# Patient Record
Sex: FEMALE | Race: WHITE | Employment: OTHER | URBAN - METROPOLITAN AREA
[De-identification: names, ages, dates, MRNs, and addresses within clinical notes are randomized per-mention and may not be internally consistent; named-entity substitution may affect disease eponyms.]

---

## 2019-03-05 ENCOUNTER — HOSPITAL ENCOUNTER (EMERGENCY)
Age: 74
Discharge: HOME | End: 2019-03-05
Attending: EMERGENCY MEDICINE | Admitting: EMERGENCY MEDICINE
Payer: MEDICARE

## 2019-03-05 ENCOUNTER — APPOINTMENT (OUTPATIENT)
Dept: GENERAL RADIOLOGY | Age: 74
End: 2019-03-05
Attending: EMERGENCY MEDICINE
Payer: MEDICARE

## 2019-03-05 VITALS
HEART RATE: 63 BPM | DIASTOLIC BLOOD PRESSURE: 79 MMHG | OXYGEN SATURATION: 96 % | BODY MASS INDEX: 27.15 KG/M2 | SYSTOLIC BLOOD PRESSURE: 149 MMHG | WEIGHT: 153.22 LBS | RESPIRATION RATE: 12 BRPM | HEIGHT: 63 IN | TEMPERATURE: 97.4 F

## 2019-03-05 DIAGNOSIS — J20.9 ACUTE BRONCHITIS, UNSPECIFIED ORGANISM: Primary | ICD-10-CM

## 2019-03-05 LAB
FLUAV AG NPH QL IA: NEGATIVE
FLUBV AG NOSE QL IA: NEGATIVE

## 2019-03-05 PROCEDURE — 77030029684 HC NEB SM VOL KT MONA -A

## 2019-03-05 PROCEDURE — 74011636637 HC RX REV CODE- 636/637: Performed by: EMERGENCY MEDICINE

## 2019-03-05 PROCEDURE — 99284 EMERGENCY DEPT VISIT MOD MDM: CPT

## 2019-03-05 PROCEDURE — 71046 X-RAY EXAM CHEST 2 VIEWS: CPT

## 2019-03-05 PROCEDURE — 87804 INFLUENZA ASSAY W/OPTIC: CPT

## 2019-03-05 PROCEDURE — 74011000250 HC RX REV CODE- 250: Performed by: EMERGENCY MEDICINE

## 2019-03-05 PROCEDURE — A9270 NON-COVERED ITEM OR SERVICE: HCPCS | Performed by: EMERGENCY MEDICINE

## 2019-03-05 PROCEDURE — 94640 AIRWAY INHALATION TREATMENT: CPT

## 2019-03-05 RX ORDER — PREDNISONE 20 MG/1
TABLET ORAL
Status: DISCONTINUED
Start: 2019-03-05 | End: 2019-03-05 | Stop reason: HOSPADM

## 2019-03-05 RX ORDER — AZITHROMYCIN 250 MG/1
TABLET, FILM COATED ORAL
Qty: 6 TAB | Refills: 0 | Status: SHIPPED | OUTPATIENT
Start: 2019-03-05 | End: 2020-02-11

## 2019-03-05 RX ORDER — IPRATROPIUM BROMIDE AND ALBUTEROL SULFATE 2.5; .5 MG/3ML; MG/3ML
3 SOLUTION RESPIRATORY (INHALATION)
Status: COMPLETED | OUTPATIENT
Start: 2019-03-05 | End: 2019-03-05

## 2019-03-05 RX ORDER — PREDNISONE 20 MG/1
60 TABLET ORAL
Status: COMPLETED | OUTPATIENT
Start: 2019-03-05 | End: 2019-03-05

## 2019-03-05 RX ORDER — IPRATROPIUM BROMIDE AND ALBUTEROL SULFATE 2.5; .5 MG/3ML; MG/3ML
SOLUTION RESPIRATORY (INHALATION)
Status: DISCONTINUED
Start: 2019-03-05 | End: 2019-03-05 | Stop reason: HOSPADM

## 2019-03-05 RX ORDER — PREDNISONE 20 MG/1
60 TABLET ORAL DAILY
Qty: 12 TAB | Refills: 0 | Status: SHIPPED | OUTPATIENT
Start: 2019-03-05 | End: 2019-03-09

## 2019-03-05 RX ORDER — ALBUTEROL SULFATE 90 UG/1
2 AEROSOL, METERED RESPIRATORY (INHALATION)
Qty: 1 INHALER | Refills: 0 | Status: SHIPPED | OUTPATIENT
Start: 2019-03-05 | End: 2020-02-11

## 2019-03-05 RX ADMIN — IPRATROPIUM BROMIDE AND ALBUTEROL SULFATE 3 ML: .5; 3 SOLUTION RESPIRATORY (INHALATION) at 01:19

## 2019-03-05 RX ADMIN — PREDNISONE 60 MG: 20 TABLET ORAL at 01:19

## 2019-03-05 NOTE — DISCHARGE INSTRUCTIONS
Patient Education        Bronchitis: Care Instructions  Your Care Instructions    Bronchitis is inflammation of the bronchial tubes, which carry air to the lungs. The tubes swell and produce mucus, or phlegm. The mucus and inflamed bronchial tubes make you cough. You may have trouble breathing. Most cases of bronchitis are caused by viruses like those that cause colds. Antibiotics usually do not help and they may be harmful. Bronchitis usually develops rapidly and lasts about 2 to 3 weeks in otherwise healthy people. Follow-up care is a key part of your treatment and safety. Be sure to make and go to all appointments, and call your doctor if you are having problems. It's also a good idea to know your test results and keep a list of the medicines you take. How can you care for yourself at home? · Take all medicines exactly as prescribed. Call your doctor if you think you are having a problem with your medicine. · Get some extra rest.  · Take an over-the-counter pain medicine, such as acetaminophen (Tylenol), ibuprofen (Advil, Motrin), or naproxen (Aleve) to reduce fever and relieve body aches. Read and follow all instructions on the label. · Do not take two or more pain medicines at the same time unless the doctor told you to. Many pain medicines have acetaminophen, which is Tylenol. Too much acetaminophen (Tylenol) can be harmful. · Take an over-the-counter cough medicine that contains dextromethorphan to help quiet a dry, hacking cough so that you can sleep. Avoid cough medicines that have more than one active ingredient. Read and follow all instructions on the label. · Breathe moist air from a humidifier, hot shower, or sink filled with hot water. The heat and moisture will thin mucus so you can cough it out. · Do not smoke. Smoking can make bronchitis worse. If you need help quitting, talk to your doctor about stop-smoking programs and medicines.  These can increase your chances of quitting for good.  When should you call for help? Call 911 anytime you think you may need emergency care. For example, call if:    · You have severe trouble breathing.    Call your doctor now or seek immediate medical care if:    · You have new or worse trouble breathing.     · You cough up dark brown or bloody mucus (sputum).     · You have a new or higher fever.     · You have a new rash.    Watch closely for changes in your health, and be sure to contact your doctor if:    · You cough more deeply or more often, especially if you notice more mucus or a change in the color of your mucus.     · You are not getting better as expected. Where can you learn more? Go to http://wai-fernando.info/. Enter H333 in the search box to learn more about \"Bronchitis: Care Instructions. \"  Current as of: September 5, 2018  Content Version: 11.9  © 8342-0952 [employer]. Care instructions adapted under license by [employer] (which disclaims liability or warranty for this information). If you have questions about a medical condition or this instruction, always ask your healthcare professional. [address] any warranty or liability for your use of this information.

## 2019-03-05 NOTE — ED NOTES
Discharge instructions reviewed with pt and copy given along with RX by Zoe Bennett. Pt educated on following up with PCP. Pt ambulatory from ED accompanied by son in no sign of distress or discomfort.

## 2019-03-05 NOTE — ED PROVIDER NOTES
Pt. Presents to the ER with a cough and SOB. Pt. Started to feel ill five days ago. Her SOB worsened this evening. She was coughing so much that she began to feel SOB. Her cough is non-productive. No CP. NO abd pain. Pt. Denies a history of lung disease. Pt. Has had multiple sick contacts with URI sx.  -fevers. This most recent episode of SOB started approximately 45min PTA. Past Medical History:   Diagnosis Date    Adverse effect of anesthesia     Slow to awake from Anesthesia    Difficult intubation 12/11/15    required glidescope and bougie    Hypertension     Ill-defined condition     Tinnitus    Ill-defined condition 2011    Fractured Left ankle       Past Surgical History:   Procedure Laterality Date    HX BREAST BIOPSY Left 2005? Benign    HX COLONOSCOPY      HX DILATION AND CURETTAGE      for uterine polyp    HX ORTHOPAEDIC Left 2011    ORIF Left ankle         History reviewed. No pertinent family history. Social History     Socioeconomic History    Marital status:      Spouse name: Not on file    Number of children: Not on file    Years of education: Not on file    Highest education level: Not on file   Social Needs    Financial resource strain: Not on file    Food insecurity - worry: Not on file    Food insecurity - inability: Not on file    Transportation needs - medical: Not on file   [facility] needs - non-medical: Not on file   Occupational History    Not on file   Tobacco Use    Smoking status: Never Smoker    Smokeless tobacco: Never Used   Substance and Sexual Activity    Alcohol use: No    Drug use: No    Sexual activity: Not on file   Other Topics Concern    Not on file   Social History Narrative    Not on file         ALLERGIES: Clindamycin and Statins-hmg-coa reductase inhibitors    Review of Systems   Constitutional: Negative for chills and fever. HENT: Negative for rhinorrhea and sore throat.     Respiratory: Positive for cough and shortness of breath. Cardiovascular: Negative for chest pain. Gastrointestinal: Negative for abdominal pain, diarrhea, nausea and vomiting. Genitourinary: Negative for dysuria and urgency. Musculoskeletal: Negative for arthralgias and back pain. Skin: Negative for rash. Neurological: Negative for dizziness, weakness and light-headedness. Vitals:    03/05/19 0000 03/05/19 0049   BP:  158/72   Pulse:  63   Resp:  12   Temp:  97.4 °F (36.3 °C)   SpO2:  97%   Weight: 69.5 kg (153 lb 3.5 oz)    Height: 5' 3\" (1.6 m)             Physical Exam     Vital signs reviewed. Nursing notes reviewed. Const:  No acute distress, well developed, well nourished  Head:  Atraumatic, normocephalic  Eyes:  PERRL, conjunctiva normal, no scleral icterus  Neck:  Supple, trachea midline  Cardiovascular:  RRR, no murmurs, no gallops, no rubs  Resp:  No resp distress, no increased work of breathing, no wheezes, no rhonchi, no rales, cough with deep inspiration  Abd:  Soft, non-tender, non-distended, no rebound, no guarding, no CVA tenderness  :  Deferred  MSK:  No pedal edema, normal ROM  Neuro:  Alert and oriented x3, no cranial nerve defect  Skin:  Warm, dry, intact  Psych: normal mood and affect, behavior is normal, judgement and thought content is normal          MDM  Number of Diagnoses or Management Options  Acute bronchitis, unspecified organism:      Amount and/or Complexity of Data Reviewed  Clinical lab tests: ordered and reviewed  Tests in the radiology section of CPT®: ordered and reviewed  Review and summarize past medical records: yes    Patient Progress  Patient progress: stable          Pt. Presents to the ER with complaints of cough and SOB. He is well appearing in the ER. NO pneumonia on xray. Negative flu. I will start her on a zpak, prednisone, and an albuterol inhaler. Pt. To f/u with her PCP or return to the ER with worsening sx.       Procedures

## 2019-03-05 NOTE — ED TRIAGE NOTES
Pt reports that tonight \"she started having a coughing fit and then started to have trouble breathing. \" Pt denies fevers or chest pain and all other complaints at this time.

## 2020-02-11 ENCOUNTER — APPOINTMENT (OUTPATIENT)
Dept: GENERAL RADIOLOGY | Age: 75
End: 2020-02-11
Attending: EMERGENCY MEDICINE
Payer: MEDICARE

## 2020-02-11 ENCOUNTER — HOSPITAL ENCOUNTER (EMERGENCY)
Age: 75
Discharge: HOME | End: 2020-02-11
Attending: EMERGENCY MEDICINE
Payer: MEDICARE

## 2020-02-11 VITALS
RESPIRATION RATE: 16 BRPM | OXYGEN SATURATION: 96 % | WEIGHT: 137.57 LBS | SYSTOLIC BLOOD PRESSURE: 154 MMHG | HEART RATE: 65 BPM | BODY MASS INDEX: 24.38 KG/M2 | DIASTOLIC BLOOD PRESSURE: 76 MMHG | HEIGHT: 63 IN | TEMPERATURE: 97.8 F

## 2020-02-11 DIAGNOSIS — R07.89 ATYPICAL CHEST PAIN: Primary | ICD-10-CM

## 2020-02-11 DIAGNOSIS — R07.9 CHEST PAIN, UNSPECIFIED TYPE: Primary | ICD-10-CM

## 2020-02-11 LAB
ALBUMIN SERPL-MCNC: 3.7 G/DL (ref 3.5–5)
ALBUMIN/GLOB SERPL: 1 {RATIO} (ref 1.1–2.2)
ALP SERPL-CCNC: 66 U/L (ref 45–117)
ALT SERPL-CCNC: 20 U/L (ref 12–78)
ANION GAP SERPL CALC-SCNC: 12 MMOL/L (ref 5–15)
AST SERPL-CCNC: 15 U/L (ref 15–37)
ATRIAL RATE: 57 BPM
ATRIAL RATE: 61 BPM
BASOPHILS # BLD: 0 K/UL (ref 0–0.1)
BASOPHILS NFR BLD: 0 % (ref 0–1)
BILIRUB SERPL-MCNC: 0.6 MG/DL (ref 0.2–1)
BNP SERPL-MCNC: 141 PG/ML (ref 0–125)
BUN SERPL-MCNC: 24 MG/DL (ref 6–20)
BUN/CREAT SERPL: 29 (ref 12–20)
CALCIUM SERPL-MCNC: 8.9 MG/DL (ref 8.5–10.1)
CALCULATED P AXIS, ECG09: 19 DEGREES
CALCULATED P AXIS, ECG09: 20 DEGREES
CALCULATED R AXIS, ECG10: -17 DEGREES
CALCULATED R AXIS, ECG10: -8 DEGREES
CALCULATED T AXIS, ECG11: 24 DEGREES
CALCULATED T AXIS, ECG11: 4 DEGREES
CHLORIDE SERPL-SCNC: 100 MMOL/L (ref 97–108)
CO2 SERPL-SCNC: 27 MMOL/L (ref 21–32)
COMMENT, HOLDF: NORMAL
CREAT SERPL-MCNC: 0.82 MG/DL (ref 0.55–1.02)
DIAGNOSIS, 93000: NORMAL
DIAGNOSIS, 93000: NORMAL
DIFFERENTIAL METHOD BLD: NORMAL
EOSINOPHIL # BLD: 0 K/UL (ref 0–0.4)
EOSINOPHIL NFR BLD: 0 % (ref 0–7)
ERYTHROCYTE [DISTWIDTH] IN BLOOD BY AUTOMATED COUNT: 12.5 % (ref 11.5–14.5)
GLOBULIN SER CALC-MCNC: 3.7 G/DL (ref 2–4)
GLUCOSE SERPL-MCNC: 95 MG/DL (ref 65–100)
HCT VFR BLD AUTO: 42.4 % (ref 35–47)
HGB BLD-MCNC: 13.9 G/DL (ref 11.5–16)
IMM GRANULOCYTES # BLD AUTO: 0 K/UL (ref 0–0.04)
IMM GRANULOCYTES NFR BLD AUTO: 0 % (ref 0–0.5)
LYMPHOCYTES # BLD: 1.2 K/UL (ref 0.8–3.5)
LYMPHOCYTES NFR BLD: 18 % (ref 12–49)
MCH RBC QN AUTO: 30 PG (ref 26–34)
MCHC RBC AUTO-ENTMCNC: 32.8 G/DL (ref 30–36.5)
MCV RBC AUTO: 91.4 FL (ref 80–99)
MONOCYTES # BLD: 0.5 K/UL (ref 0–1)
MONOCYTES NFR BLD: 7 % (ref 5–13)
NEUTS SEG # BLD: 5 K/UL (ref 1.8–8)
NEUTS SEG NFR BLD: 75 % (ref 32–75)
NRBC # BLD: 0 K/UL (ref 0–0.01)
NRBC BLD-RTO: 0 PER 100 WBC
P-R INTERVAL, ECG05: 154 MS
P-R INTERVAL, ECG05: 158 MS
PLATELET # BLD AUTO: 223 K/UL (ref 150–400)
PMV BLD AUTO: 10.9 FL (ref 8.9–12.9)
POTASSIUM SERPL-SCNC: 3.6 MMOL/L (ref 3.5–5.1)
PROT SERPL-MCNC: 7.4 G/DL (ref 6.4–8.2)
Q-T INTERVAL, ECG07: 434 MS
Q-T INTERVAL, ECG07: 436 MS
QRS DURATION, ECG06: 90 MS
QRS DURATION, ECG06: 94 MS
QTC CALCULATION (BEZET), ECG08: 424 MS
QTC CALCULATION (BEZET), ECG08: 436 MS
RBC # BLD AUTO: 4.64 M/UL (ref 3.8–5.2)
SAMPLES BEING HELD,HOLD: NORMAL
SODIUM SERPL-SCNC: 139 MMOL/L (ref 136–145)
TROPONIN I SERPL-MCNC: <0.05 NG/ML
TROPONIN I SERPL-MCNC: <0.05 NG/ML
VENTRICULAR RATE, ECG03: 57 BPM
VENTRICULAR RATE, ECG03: 61 BPM
WBC # BLD AUTO: 6.7 K/UL (ref 3.6–11)

## 2020-02-11 PROCEDURE — 80053 COMPREHEN METABOLIC PANEL: CPT

## 2020-02-11 PROCEDURE — 93005 ELECTROCARDIOGRAM TRACING: CPT

## 2020-02-11 PROCEDURE — 84484 ASSAY OF TROPONIN QUANT: CPT

## 2020-02-11 PROCEDURE — 99285 EMERGENCY DEPT VISIT HI MDM: CPT

## 2020-02-11 PROCEDURE — 85025 COMPLETE CBC W/AUTO DIFF WBC: CPT

## 2020-02-11 PROCEDURE — 36415 COLL VENOUS BLD VENIPUNCTURE: CPT

## 2020-02-11 PROCEDURE — 83880 ASSAY OF NATRIURETIC PEPTIDE: CPT

## 2020-02-11 RX ORDER — PANTOPRAZOLE SODIUM 40 MG/1
40 TABLET, DELAYED RELEASE ORAL DAILY
COMMUNITY
End: 2021-10-06 | Stop reason: SDUPTHER

## 2020-02-11 RX ORDER — LOVASTATIN 20 MG/1
20 TABLET ORAL EVERY OTHER DAY
COMMUNITY
End: 2020-06-11 | Stop reason: SDUPTHER

## 2020-02-11 NOTE — ED NOTES
Pt ambulatory out of ED with discharge instructions in hand given by Dr. Josselyn Schneider; pt verbalized understanding of discharge paperwork and time allotted for questions. VSS. Pt alert and oriented.

## 2020-02-11 NOTE — DISCHARGE INSTRUCTIONS

## 2020-02-11 NOTE — ED PROVIDER NOTES
HPI   The patient is a 23-year-old white female with a history of anxiety since her   about 6 years prior to admission presents today [facility]. She told him that she had some substernal chest pain that was nonexertional that lasted for about 30 minutes earlier today. It went away after she drank some water. She had no diaphoresis or nausea. She has had similar spells for the past 2 years due to acid reflux and she had a cardiac evaluation about a year and a half ago for the chest pain. This did not include any stress test or cardiac cath. She has a history of high cholesterol. She has no chest pain at this time an EKG shows normal sinus rhythm with no ST changes. [facility] to check cardiac enzymes and for further evaluation. Past Medical History:   Diagnosis Date    Adverse effect of anesthesia     Slow to awake from Anesthesia    Difficult intubation 12/11/15    required glidescope and bougie    Hypertension     Ill-defined condition     Tinnitus    Ill-defined condition     Fractured Left ankle       Past Surgical History:   Procedure Laterality Date    HX BREAST BIOPSY Left ? Benign    HX COLONOSCOPY      HX DILATION AND CURETTAGE      for uterine polyp    HX ORTHOPAEDIC Left 2011    ORIF Left ankle         History reviewed. No pertinent family history.     Social History     Socioeconomic History    Marital status:      Spouse name: Not on file    Number of children: Not on file    Years of education: Not on file    Highest education level: Not on file   Occupational History    Not on file   Social Needs    Financial resource strain: Not on file    Food insecurity:     Worry: Not on file     Inability: Not on file    Transportation needs:     Medical: Not on file     Non-medical: Not on file   Tobacco Use    Smoking status: Never Smoker    Smokeless tobacco: Never Used Substance and Sexual Activity    Alcohol use: No    Drug use: No    Sexual activity: Not on file   Lifestyle    Physical activity:     Days per week: Not on file     Minutes per session: Not on file    Stress: Not on file   Relationships    Social connections:     Talks on phone: Not on file     Gets together: Not on file     Attends [employer] service: Not on file     Active member of club or organization: Not on file     Attends meetings of clubs or organizations: Not on file     Relationship status: Not on file    Intimate partner violence:     Fear of current or ex partner: Not on file     Emotionally abused: Not on file     Physically abused: Not on file     Forced sexual activity: Not on file   Other Topics Concern    Not on file   Social History Narrative    Not on file         ALLERGIES: Clindamycin and Statins-hmg-coa reductase inhibitors    Review of Systems   All other systems reviewed and are negative. Vitals:    02/11/20 1345   BP: 183/73   Pulse: 87   Resp: 18   Temp: 97.7 °F (36.5 °C)   SpO2: 100%   Weight: 62.4 kg (137 lb 9.1 oz)   Height: 5' 3\" (1.6 m)            Physical Exam  Vitals signs and nursing note reviewed. Constitutional:       Appearance: She is well-developed. HENT:      Head: Normocephalic and atraumatic. Mouth/Throat:      Pharynx: No oropharyngeal exudate. Eyes:      General: No scleral icterus. Conjunctiva/sclera: Conjunctivae normal.   Neck:      Musculoskeletal: Neck supple. Thyroid: No thyromegaly. Cardiovascular:      Rate and Rhythm: Normal rate and regular rhythm. Heart sounds: Normal heart sounds. No murmur. No friction rub. No gallop. Pulmonary:      Effort: Pulmonary effort is normal. No respiratory distress. Breath sounds: Normal breath sounds. No stridor. No wheezing or rales. Abdominal:      General: Bowel sounds are normal.      Palpations: Abdomen is soft. Tenderness: There is no abdominal tenderness.  There is no guarding or rebound. Musculoskeletal: Normal range of motion. Lymphadenopathy:      Cervical: No cervical adenopathy. Skin:     General: Skin is warm and dry. Neurological:      Mental Status: She is alert and oriented to person, place, and time.           MDM       Procedures

## 2020-02-11 NOTE — ED TRIAGE NOTES
Triage: pt c/o mid chest pain radiated into right and left side of chest starting when woke this AM. Pt was seen at [facility] and referred here for cardiac work up. Denies SOB, N/V/D, fever, dizziness.

## 2020-02-12 ENCOUNTER — TELEPHONE (OUTPATIENT)
Dept: CARDIOLOGY CLINIC | Age: 75
End: 2020-02-12

## 2020-02-12 NOTE — TELEPHONE ENCOUNTER
Patient is calling back to schedule a stress echo and new patient appointment w/ Dr. Tita Rosas for this week. She was advised to get have this test done before she leaves for [place]. Please advise.     Phone #: [phone]  Thanks

## 2020-02-12 NOTE — TELEPHONE ENCOUNTER
Verified patient with two types of identifiers. Let patient know that unfortunately we are booked out to the end of February for PAUL in the office. Scheduled patient for tomorrow at [facility] at 1030. Went over instructions for the test and her new patient appointment with Dr. Sivakumar Candelario on Friday at 80. Patient verbalized understanding and will call with any other questions.

## 2020-02-13 ENCOUNTER — HOSPITAL ENCOUNTER (OUTPATIENT)
Dept: NON INVASIVE DIAGNOSTICS | Age: 75
Discharge: HOME | End: 2020-02-13
Attending: SPECIALIST
Payer: MEDICARE

## 2020-02-13 VITALS
BODY MASS INDEX: 24.27 KG/M2 | WEIGHT: 137 LBS | DIASTOLIC BLOOD PRESSURE: 80 MMHG | HEIGHT: 63 IN | SYSTOLIC BLOOD PRESSURE: 147 MMHG

## 2020-02-13 PROCEDURE — 93351 STRESS TTE COMPLETE: CPT

## 2020-02-14 ENCOUNTER — OFFICE VISIT (OUTPATIENT)
Dept: CARDIOLOGY CLINIC | Age: 75
End: 2020-02-14

## 2020-02-14 VITALS
SYSTOLIC BLOOD PRESSURE: 126 MMHG | BODY MASS INDEX: 26.93 KG/M2 | HEART RATE: 69 BPM | DIASTOLIC BLOOD PRESSURE: 78 MMHG | RESPIRATION RATE: 17 BRPM | HEIGHT: 63 IN | WEIGHT: 152 LBS | OXYGEN SATURATION: 99 %

## 2020-02-14 DIAGNOSIS — I10 ESSENTIAL HYPERTENSION: Primary | ICD-10-CM

## 2020-02-14 DIAGNOSIS — R07.89 OTHER CHEST PAIN: ICD-10-CM

## 2020-02-14 DIAGNOSIS — K21.00 GASTROESOPHAGEAL REFLUX DISEASE WITH ESOPHAGITIS: ICD-10-CM

## 2020-02-14 DIAGNOSIS — E78.49 OTHER HYPERLIPIDEMIA: ICD-10-CM

## 2020-02-14 LAB
STRESS ANGINA INDEX: 0
STRESS BASELINE DIAS BP: 80 MMHG
STRESS BASELINE HR: 58 BPM
STRESS BASELINE SYS BP: 143 MMHG
STRESS ESTIMATED WORKLOAD: 9.5 METS
STRESS EXERCISE DUR MIN: NORMAL
STRESS PEAK DIAS BP: 74 MMHG
STRESS PEAK SYS BP: 148 MMHG
STRESS PERCENT HR ACHIEVED: 91 %
STRESS POST PEAK HR: 133 BPM
STRESS RATE PRESSURE PRODUCT: NORMAL BPM*MMHG
STRESS SR DUKE TREADMILL SCORE: 0
STRESS ST DEPRESSION: 0 MM
STRESS ST ELEVATION: 0 MM
STRESS TARGET HR: 146 BPM

## 2020-02-14 NOTE — PROGRESS NOTES
HISTORY OF PRESENT ILLNESS  Elli Box is a 77 y.o. female. Patient with a history of hypertension, hyperlipidemia who was [facility] in February 2020 for chest discomfort. Work-up at that point was negative for myocardial ischemia. She was referred here for further evaluation. 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 02/13/20   ECHO STRESS 02/14/2020 2/14/2020    Narrative · Normal stress echocardiogram. Low risk study. Signed by: Nancy Pereira MD       Past Medical History:   Diagnosis Date    Adverse effect of anesthesia     Slow to awake from Anesthesia    Difficult intubation 12/11/15    required glidescope and bougie    Hypertension     Ill-defined condition     Tinnitus    Ill-defined condition 2011    Fractured Left ankle     Past Surgical History:   Procedure Laterality Date    HX BREAST BIOPSY Left 2005? Benign    HX COLONOSCOPY      HX DILATION AND CURETTAGE      for uterine polyp    HX ORTHOPAEDIC Left 2011    ORIF Left ankle     Social History     Tobacco Use    Smoking status: Never Smoker    Smokeless tobacco: Never Used   Substance Use Topics    Alcohol use: No    Drug use: No     History reviewed. No pertinent family history. HPI  The patient tells me that chest pain that she has had the days she went to the emergency room this was very similar to her gastroesophageal reflux disease chest pain. She has had no recurrence thus far. No shortness of breath no palpitations. Review of Systems   Respiratory: Negative. Cardiovascular: Negative. Physical Exam  Physical Exam   Blood pressure 126/78, pulse 69, resp. rate 17, height 5' 3\" (1.6 m), weight 152 lb (68.9 kg), SpO2 99 %. Constitutional: She is oriented to person, place, and time. She appears well-developed and well-nourished. No distress. HENT: Head: Normocephalic. Eyes: No scleral icterus. Neck: Normal range of motion. Neck supple.  No JVD present. No tracheal deviation present. Cardiovascular: Normal rate, regular rhythm, normal heart sounds and intact distal pulses. Exam reveals no gallop and no friction rub. No murmur heard. Pulmonary/Chest: Effort normal and breath sounds normal. No stridor. No respiratory distress, wheezes or  rales. Abdominal: She exhibits no distension. Musculoskeletal: She exhibits no edema. Neurological: She is alert and oriented to person, place, and time. Coordination normal.   Skin: Skin is warm. No rash noted. Not diaphoretic. No erythema. Psychiatric:  Normal mood and affect. Behavior is normal.     ASSESSMENT and PLAN  1. Chest pain: Chest pain had typical and atypical features. She does have some risk factors for coronary artery disease. Her electrocardiogram done in the hospital revealed normal sinus rhythm without acute ST-T wave abnormalities. She has undergone stress echocardiogram as above. This failed to reveal any significant ischemia and revealed a normal ejection fraction. She has had no recurrent chest discomfort at this time. I discussed with her the potential for false positive and false negative stress test and she will let me know if she has any recurring chest discomfort. In the meanwhile though I also encouraged her to follow-up with her primary care physician and consider GI evaluation. 2.  Hypertension: This seems to be very well controlled today. 3.  Hyperlipidemia: Closely followed by her primary care physician. We have discussed possible follow-up she wants to follow-up on a as needed basis at this point which I feel is very reasonable.

## 2020-02-21 ENCOUNTER — OFFICE VISIT (OUTPATIENT)
Dept: PRIMARY CARE CLINIC | Age: 75
End: 2020-02-21

## 2020-02-21 VITALS
HEIGHT: 63 IN | DIASTOLIC BLOOD PRESSURE: 79 MMHG | SYSTOLIC BLOOD PRESSURE: 131 MMHG | BODY MASS INDEX: 26.97 KG/M2 | RESPIRATION RATE: 16 BRPM | HEART RATE: 78 BPM | WEIGHT: 152.2 LBS | TEMPERATURE: 97.6 F | OXYGEN SATURATION: 97 %

## 2020-02-21 DIAGNOSIS — I10 ESSENTIAL HYPERTENSION: ICD-10-CM

## 2020-02-21 DIAGNOSIS — R06.2 EXPIRATORY WHEEZING: ICD-10-CM

## 2020-02-21 DIAGNOSIS — E78.2 MIXED HYPERLIPIDEMIA: ICD-10-CM

## 2020-02-21 DIAGNOSIS — J20.9 BRONCHITIS, ACUTE, WITH BRONCHOSPASM: Primary | ICD-10-CM

## 2020-02-21 DIAGNOSIS — K22.719 BARRETT'S ESOPHAGUS WITH DYSPLASIA: ICD-10-CM

## 2020-02-21 DIAGNOSIS — M25.552 LEFT HIP PAIN: ICD-10-CM

## 2020-02-21 PROBLEM — R07.89 OTHER CHEST PAIN: Status: RESOLVED | Noted: 2020-02-14 | Resolved: 2020-02-21

## 2020-02-21 RX ORDER — AZITHROMYCIN 250 MG/1
TABLET, FILM COATED ORAL
Qty: 6 TAB | Refills: 0 | Status: SHIPPED | OUTPATIENT
Start: 2020-02-21 | End: 2020-02-26

## 2020-02-21 RX ORDER — ALBUTEROL SULFATE 0.83 MG/ML
2.5 SOLUTION RESPIRATORY (INHALATION) ONCE
Qty: 1 EACH | Refills: 0
Start: 2020-02-21 | End: 2020-02-21

## 2020-02-21 RX ORDER — BENZONATATE 200 MG/1
200 CAPSULE ORAL
Qty: 21 CAP | Refills: 0 | Status: SHIPPED | OUTPATIENT
Start: 2020-02-21 | End: 2020-02-28

## 2020-02-21 RX ORDER — METHYLPREDNISOLONE 4 MG/1
TABLET ORAL
Qty: 1 DOSE PACK | Refills: 0 | Status: SHIPPED | OUTPATIENT
Start: 2020-02-21 | End: 2020-05-27 | Stop reason: ALTCHOICE

## 2020-02-21 RX ORDER — ALBUTEROL SULFATE 90 UG/1
AEROSOL, METERED RESPIRATORY (INHALATION)
Status: ON HOLD | COMMUNITY
End: 2022-03-24

## 2020-02-21 NOTE — PROGRESS NOTES
Written by Alie Napier, as dictated by Dr. Chanel Murillo MD.    Rishi Vaca is a 77 y.o. female. HPI  The patient comes in today to establish care. She moved from [place] 6 years ago and was previously going to [facility], but has had several problems coming up. She needs a new PCP. She repors F hx of heart disease (mother). She c/o cold symptoms x 2 days. She endorses congestion, wheezing, and cough. She notes she was previously at [facility], and thinks she may have picked up something while there. She has a h/o bronchitis in the past. She has not taken any medication yet. She was given  a nebulizing treatment in office today, which she reports helped. She went to the ED on 03/05/2020 for chest pain. She was then seen by Dr. 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 She is currently taking Protonix 40 mg. She reports F hx of esophageal cancer (grandfather) and colon cancer (father). She does have back pain with L-sided sciatica, for which she has been taking Motrin, but it has caused GERD. She has been doing exercises given to her previously by PT, which has helped out. She has a h/o high cholesterol, but notes that she has reactions to statin. She is currently taking Lovastatin 20 mg every other day. She notes that she has had h/o C. Diff from Clindamycin in the past, and is worried about taking antibiotics. She has a h/o hypertension and takes Lisinopril-HCTZ 20-12.5 mg. BP in office looks good today. Patient Active Problem List   Diagnosis Code    Essential hypertension I10    Other hyperlipidemia E78.49        Current Outpatient Medications on File Prior to Visit   Medication Sig Dispense Refill    albuterol (PROAIR HFA) 90 mcg/actuation inhaler Take  by inhalation.  lovastatin (MEVACOR) 20 mg tablet Take 20 mg by mouth every other day.       pantoprazole (PROTONIX) 40 mg tablet Take 40 mg by mouth daily.  lisinopril-hydrochlorothiazide (PRINZIDE, ZESTORETIC) 20-12.5 mg per tablet Take 1 Tab by mouth Daily (before breakfast).  co-enzyme Q-10 (CO Q-10) 100 mg capsule Take 200 mg by mouth daily (after dinner).  OTHER Take 2 Caps by mouth daily (after dinner). Juice Plus-Orchard Blend- Blended Fruit and Vegetable Juice Powders-takes 2 caps daily  Juice Plus Garden Blend-Blended fruit and vegetable juice powders-takes 2 caps daily       No current facility-administered medications on file prior to visit. Allergies   Allergen Reactions    Clindamycin Diarrhea     \"It gave me C-Dif. \"    Statins-Hmg-Coa Reductase Inhibitors Other (comments)     \"It makes all of my joints ache. \"       Past Medical History:   Diagnosis Date    Adverse effect of anesthesia     Slow to awake from Anesthesia    Difficult intubation 12/11/15    required glidescope and bougie    Hypertension     Ill-defined condition     Tinnitus    Ill-defined condition 2011    Fractured Left ankle       Past Surgical History:   Procedure Laterality Date    HX BREAST BIOPSY Left 2005?     Benign    HX COLONOSCOPY      HX DILATION AND CURETTAGE      for uterine polyp    HX ORTHOPAEDIC Left 2011    ORIF Left ankle       Family History   Problem Relation Age of Onset    Stroke Mother     Colon Cancer Father        Social History     Socioeconomic History    Marital status:      Spouse name: Not on file    Number of children: Not on file    Years of education: Not on file    Highest education level: Not on file   Occupational History    Not on file   Social Needs    Financial resource strain: Not on file    Food insecurity:     Worry: Not on file     Inability: Not on file    Transportation needs:     Medical: Not on file     Non-medical: Not on file   Tobacco Use    Smoking status: Never Smoker    Smokeless tobacco: Never Used   Substance and Sexual Activity    Alcohol use: No    Drug use: No    Sexual activity: Not on file   Lifestyle    Physical activity:     Days per week: Not on file     Minutes per session: Not on file    Stress: Not on file   Relationships    Social connections:     Talks on phone: Not on file     Gets together: Not on file     Attends Mu-ism service: Not on file     Active member of club or organization: Not on file     Attends meetings of clubs or organizations: Not on file     Relationship status: Not on file    Intimate partner violence:     Fear of current or ex partner: Not on file     Emotionally abused: Not on file     Physically abused: Not on file     Forced sexual activity: Not on file   Other Topics Concern    Not on file   Social History Narrative    Not on file       Hospital Outpatient Visit on 02/13/2020   Component Date Value Ref Range Status    Target HR 02/13/2020 146  bpm Final    Exercise duration time 02/13/2020 00:07:39   Final    Stress Base Systolic BP 01/29/5988 654  mmHg Final    Stress Base Diastolic BP 02/95/1534 74  mmHg Final    Post peak HR 02/13/2020 133  BPM Final    Baseline HR 02/13/2020 58  BPM Final    Estimated workload 02/13/2020 9.5  METS Final    Baseline BP 02/13/2020 143  mmHg Final    Percent HR 02/13/2020 91  % Final    Angina Index 02/13/2020 0   Final    Stress Base Diastolic BP 66/85/4078 80  mmHg Final    Stress Rate Pressure Product 02/13/2020 19,684  BPM*mmHg Final    ST Elevation (mm) 02/13/2020 0  mm Final    ST Depression (mm) 02/13/2020 0  mm Final    STRESS SR BULLARD TREADMILL SCORE 02/13/2020 0   Final   Admission on 02/11/2020, Discharged on 02/11/2020   Component Date Value Ref Range Status    Ventricular Rate 02/11/2020 61  BPM Final    Atrial Rate 02/11/2020 61  BPM Final    P-R Interval 02/11/2020 154  ms Final    QRS Duration 02/11/2020 94  ms Final    Q-T Interval 02/11/2020 434  ms Final    QTC Calculation (Bezet) 02/11/2020 436  ms Final    Calculated P [place] 02/11/2020 20  degrees Final    Calculated R Axis 02/11/2020 -8  degrees Final    Calculated T Axis 02/11/2020 24  degrees Final    Diagnosis 02/11/2020    Final                    Value:Normal sinus rhythm  Normal ECG  When compared with ECG of 04-DEC-2015 10:31,  No significant change was found  Confirmed by Marta Mathur MD, Lorin Officer ([id number]) on 2/11/2020 7:16:25 PM      WBC 02/11/2020 6.7  3.6 - 11.0 K/uL Final    RBC 02/11/2020 4.64  3.80 - 5.20 M/uL Final    HGB 02/11/2020 13.9  11.5 - 16.0 g/dL Final    HCT 02/11/2020 42.4  35.0 - 47.0 % Final    MCV 02/11/2020 91.4  80.0 - 99.0 FL Final    MCH 02/11/2020 30.0  26.0 - 34.0 PG Final    MCHC 02/11/2020 32.8  30.0 - 36.5 g/dL Final    RDW 02/11/2020 12.5  11.5 - 14.5 % Final    PLATELET 61/29/3731 839  150 - 400 K/uL Final    MPV 02/11/2020 10.9  8.9 - 12.9 FL Final    NRBC 02/11/2020 0.0  0  WBC Final    ABSOLUTE NRBC 02/11/2020 0.00  0.00 - 0.01 K/uL Final    NEUTROPHILS 02/11/2020 75  32 - 75 % Final    LYMPHOCYTES 02/11/2020 18  12 - 49 % Final    MONOCYTES 02/11/2020 7  5 - 13 % Final    EOSINOPHILS 02/11/2020 0  0 - 7 % Final    BASOPHILS 02/11/2020 0  0 - 1 % Final    IMMATURE GRANULOCYTES 02/11/2020 0  0.0 - 0.5 % Final    ABS. NEUTROPHILS 02/11/2020 5.0  1.8 - 8.0 K/UL Final    ABS. LYMPHOCYTES 02/11/2020 1.2  0.8 - 3.5 K/UL Final    ABS. MONOCYTES 02/11/2020 0.5  0.0 - 1.0 K/UL Final    ABS. EOSINOPHILS 02/11/2020 0.0  0.0 - 0.4 K/UL Final    ABS. BASOPHILS 02/11/2020 0.0  0.0 - 0.1 K/UL Final    ABS. IMM.  GRANS. 02/11/2020 0.0  0.00 - 0.04 K/UL Final    DF 02/11/2020 AUTOMATED    Final    Sodium 02/11/2020 139  136 - 145 mmol/L Final    Potassium 02/11/2020 3.6  3.5 - 5.1 mmol/L Final    Chloride 02/11/2020 100  97 - 108 mmol/L Final    CO2 02/11/2020 27  21 - 32 mmol/L Final    Anion gap 02/11/2020 12  5 - 15 mmol/L Final    Glucose 02/11/2020 95  65 - 100 mg/dL Final    BUN 02/11/2020 24* 6 - 20 MG/DL Final  Creatinine 02/11/2020 0.82  0.55 - 1.02 MG/DL Final    BUN/Creatinine ratio 02/11/2020 29* 12 - 20   Final    GFR est AA 02/11/2020 >60  >60 ml/min/1.73m2 Final    GFR est non-AA 02/11/2020 >60  >60 ml/min/1.73m2 Final    Calcium 02/11/2020 8.9  8.5 - 10.1 MG/DL Final    Bilirubin, total 02/11/2020 0.6  0.2 - 1.0 MG/DL Final    ALT (SGPT) 02/11/2020 20  12 - 78 U/L Final    AST (SGOT) 02/11/2020 15  15 - 37 U/L Final    Alk. phosphatase 02/11/2020 66  45 - 117 U/L Final    Protein, total 02/11/2020 7.4  6.4 - 8.2 g/dL Final    Albumin 02/11/2020 3.7  3.5 - 5.0 g/dL Final    Globulin 02/11/2020 3.7  2.0 - 4.0 g/dL Final    A-G Ratio 02/11/2020 1.0* 1.1 - 2.2   Final    Troponin-I, Qt. 02/11/2020 <0.05  <0.05 ng/mL Final    NT pro-BNP 02/11/2020 141* 0 - 125 PG/ML Final    Ventricular Rate 02/11/2020 57  BPM Final    Atrial Rate 02/11/2020 57  BPM Final    P-R Interval 02/11/2020 158  ms Final    QRS Duration 02/11/2020 90  ms Final    Q-T Interval 02/11/2020 436  ms Final    QTC Calculation (Bezet) 02/11/2020 424  ms Final    Calculated P Axis 02/11/2020 19  degrees Final    Calculated R Axis 02/11/2020 -17  degrees Final    Calculated T Axis 02/11/2020 4  degrees Final    Diagnosis 02/11/2020    Final                    Value:Sinus bradycardia  Minimal voltage criteria for LVH, may be normal variant  Borderline ECG    Confirmed by Linda Wallace MD, Mayo Mccrary ([id number]) on 2/11/2020 7:16:38 PM      Troponin-I, Qt. 02/11/2020 <0.05  <0.05 ng/mL Final       Review of Systems   Constitutional: Negative for malaise/fatigue and weight loss. HENT: Positive for congestion. Negative for hearing loss. Eyes: Negative for blurred vision and photophobia. Respiratory: Positive for cough and wheezing. Negative for shortness of breath. Cardiovascular: Negative for chest pain and leg swelling. Gastrointestinal: Negative for constipation, diarrhea and heartburn.    Genitourinary: Negative for dysuria, frequency and urgency. Musculoskeletal: Positive for back pain and myalgias (L hip pain). Negative for joint pain. Neurological: Negative for dizziness and headaches. Psychiatric/Behavioral: Negative for depression and substance abuse. The patient is not nervous/anxious and does not have insomnia. Visit Vitals  /79 (BP 1 Location: Left arm, BP Patient Position: Sitting)   Pulse 78   Temp 97.6 °F (36.4 °C) (Oral)   Resp 16   Ht 5' 3\" (1.6 m)   Wt 152 lb 3.2 oz (69 kg)   SpO2 97%    L/min   BMI 26.96 kg/m²       Physical Exam  Vitals signs and nursing note reviewed. Constitutional:       General: She is not in acute distress. Appearance: Normal appearance. She is well-developed, well-groomed and overweight. She is not diaphoretic. HENT:      Head: Normocephalic and atraumatic. Right Ear: External ear normal.      Left Ear: External ear normal.   Eyes:      General:         Right eye: No discharge. Left eye: No discharge. Conjunctiva/sclera: Conjunctivae normal.      Pupils: Pupils are equal, round, and reactive to light. Neck:      Musculoskeletal: Normal range of motion and neck supple. Cardiovascular:      Rate and Rhythm: Normal rate and regular rhythm. Heart sounds: Normal heart sounds. No murmur. No friction rub. No gallop. Pulmonary:      Effort: Pulmonary effort is normal.      Breath sounds: Examination of the right-middle field reveals decreased breath sounds. Decreased breath sounds present. No wheezing. Abdominal:      General: Bowel sounds are normal.      Palpations: Abdomen is soft. Tenderness: There is no abdominal tenderness. Musculoskeletal: Normal range of motion. Neurological:      Mental Status: She is alert and oriented to person, place, and time. Deep Tendon Reflexes: Reflexes are normal and symmetric. Psychiatric:         Behavior: Behavior normal.         Thought Content:  Thought content normal.         ASSESSMENT and PLAN    ICD-10-CM ICD-9-CM    1. Bronchitis, acute, with bronchospasm J20.9 466.0 methylPREDNISolone (MEDROL DOSEPACK) 4 mg tablet sent to pharmacy. benzonatate (TESSALON) 200 mg capsule sent to pharmacy. azithromycin (ZITHROMAX) 250 mg tablet script given to pt. Medrol DosePack 4 mg prescribed. Potential side effects were discussed. Pt should take as instructed on packaging with food. Tessalon perle 200 mg prescribed. Potential side effects were discussed. Pt should take 1 perle TID as needed for cough. If symptoms do not resolve, or patient develops fever, pt should start taking antibiotics. Zithromax 250 mg prescribed. Potential side effects were discussed. Pt should take as instructed on packaging. 2. Expiratory wheezing R06.2 786.07 albuterol (PROVENTIL VENTOLIN) 2.5 mg /3 mL (0.083 %) nebu      ALBUTEROL, INHAL. SOL., FDA-APPROVED FINAL, NON-COMPOUND UNIT DOSE, 1 MG      INHAL RX, AIRWAY OBST/DX SPUTUM INDUCT      methylPREDNISolone (MEDROL DOSEPACK) 4 mg tablet sent to pharmacy. Albuterol Nebulizing treatment given in office. Pt tolerated well. Medrol DosePack 4 mg prescribed. Potential side effects were discussed. Pt should take as instructed on packaging with food. 3. 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 Told her to take steroids after the food. 4. Mixed hyperlipidemia E78.2 272.2 Advised patient to take 1/2 tab of Lovastatin. 5. Essential hypertension I10 401.9 BP is well-controlled on current medication. No change to dosage at this time. 6. Left hip pain M25.552 719.45 Should improve with Medrol DosePack 4 mg. This plan was reviewed with the patient and patient agrees. All questions were answered. This scribe documentation was reviewed by me and accurately reflects the examination and decisions made by me. This note will not be viewable in [address]

## 2020-02-21 NOTE — PROGRESS NOTES
Visit Vitals  /79 (BP 1 Location: Left arm, BP Patient Position: Sitting)   Pulse 78   Temp 97.6 °F (36.4 °C) (Oral)   Resp 16   Ht 5' 3\" (1.6 m)   Wt 152 lb 3.2 oz (69 kg)   SpO2 97%   BMI 26.96 kg/m²             Chief Complaint   Patient presents with   [address]     Patient needs a PCP; Been using [facility]     URI     congestion, wheezing, cough since Monday h/o bronchitis in the past            HM Due reviewed, patient will schedule a MWV, list provided of  items due. Lungs + for bilateral SNEHA, breath sounds decreased at bases. Nebulizer administered per Dr. Jacob Pierson. 1. Have you been to the ER, urgent care clinic since your last visit? Hospitalized since your last visit? [facility]     2. Have you seen or consulted any other health care providers outside of the [address] [place] [address] since your last visit? Include any pap smears or colon screening. Patient will provide at next visit.

## 2020-05-01 ENCOUNTER — VIRTUAL VISIT (OUTPATIENT)
Dept: PRIMARY CARE CLINIC | Age: 75
End: 2020-05-01

## 2020-05-01 DIAGNOSIS — H81.13 BENIGN PAROXYSMAL POSITIONAL VERTIGO DUE TO BILATERAL VESTIBULAR DISORDER: ICD-10-CM

## 2020-05-01 DIAGNOSIS — Z71.89 ACP (ADVANCE CARE PLANNING): ICD-10-CM

## 2020-05-01 DIAGNOSIS — I10 ESSENTIAL HYPERTENSION: ICD-10-CM

## 2020-05-01 DIAGNOSIS — Z00.00 MEDICARE ANNUAL WELLNESS VISIT, SUBSEQUENT: Primary | ICD-10-CM

## 2020-05-01 DIAGNOSIS — E78.49 OTHER HYPERLIPIDEMIA: ICD-10-CM

## 2020-05-01 DIAGNOSIS — K21.9 GASTROESOPHAGEAL REFLUX DISEASE WITHOUT ESOPHAGITIS: ICD-10-CM

## 2020-05-01 DIAGNOSIS — H93.13 TINNITUS OF BOTH EARS: ICD-10-CM

## 2020-05-01 RX ORDER — DIAZEPAM 5 MG/1
5 TABLET ORAL
Qty: 10 TAB | Refills: 0 | Status: SHIPPED | OUTPATIENT
Start: 2020-05-01 | End: 2020-05-24 | Stop reason: SDUPTHER

## 2020-05-01 NOTE — PROGRESS NOTES
I was in the office while conducting this encounter. Consent:  She and/or her healthcare decision maker is aware that this patient-initiated Telehealth encounter is a billable service, with coverage as determined by her insurance carrier. She is aware that she may receive a bill and has provided verbal consent to proceed: Yes    This virtual visit was conducted via [address] Pursuant to the emergency declaration under the [facility], [facility] waiver authority and the [employer] Act, this Virtual  Visit was conducted to reduce the patient's risk of exposure to COVID-19 and provide continuity of care for an established patient. Services were provided through a video synchronous discussion virtually to substitute for in-person clinic visit. Due to this being a TeleHealth evaluation, many elements of the physical examination are unable to be assessed. Total Time: minutes: 21-30 minutes. Yovana Guerrero is a 77 y.o. female and presents for Annual Medicare Wellness Visit.     Assessment of cognitive impairment: Alert and oriented x 3     Depression Screen:   3 most recent PHQ Screens 2/21/2020   Little interest or pleasure in doing things Not at all   Feeling down, depressed, irritable, or hopeless Not at all   Total Score PHQ 2 0   Trouble falling or staying asleep, or sleeping too much Not at all   Feeling tired or having little energy Not at all   Poor appetite, weight loss, or overeating Not at all   Feeling bad about yourself - or that you are a failure or have let yourself or your family down Not at all   Trouble concentrating on things such as school, work, reading, or watching TV Not at all   Moving or speaking so slowly that other people could have noticed; or the opposite being so fidgety that others notice Not at all   Thoughts of being better off dead, or hurting yourself in some way Not at all   PHQ 9 Score 0 Fall Risk Assessment:    Fall Risk Assessment, last 12 mths 2/21/2020   Able to walk? Yes   Fall in past 12 months? No       Abuse Screen:   Abuse Screening Questionnaire 2/21/2020   Do you ever feel afraid of your partner? N   Are you in a relationship with someone who physically or mentally threatens you? N   Is it safe for you to go home? Y       Activities of Daily Living:  Self-care. Requires assistance with: no ADLs  Patient handle his/her own medications  yes Use of pill box  yes  Activities of Daily Living:   ADL Assessment 2/21/2020   Feeding yourself No Help Needed   Getting from bed to chair No Help Needed   Getting dressed No Help Needed   Bathing or showering No Help Needed   Walk across the room (includes cane/walker) No Help Needed   Using the telphone No Help Needed   Taking your medications No Help Needed   Preparing meals No Help Needed   Managing money (expenses/bills) No Help Needed   Moderately strenuous housework (laundry) No Help Needed   Shopping for personal items (toiletries/medicines) No Help Needed   Shopping for groceries No Help Needed   Driving No Help Needed   Climbing a flight of stairs No Help Needed   Getting to places beyond walking distances No Help Needed       Health Maintenance:  Daily Aspirin: no  Bone Density: up to date done in 2016 , was normal.   Glaucoma Screening: yes, done on 03/20  Immunizations:    Tetanus: up to date. Influenza: up to date. Shingles: she will get it in fall  PPSV-23: recommended getting one soon. Prevnar-13: script sent to the pharmacy     Cancer screening:    Cervical: 5 years ago was normal, does not want any more . Breast: up to date. Done in 10/19 Colon: up to date. Done in 04/19   Prostate:  N/a     Alcohol Risk Screen:   On any occasion during the past 3 months, have you had more than 3 drinks(female) or 4 drinks (male) containing alcohol in one? No  Do you average more than 7 drinks (female) or 14 drinks (male) per week?   No  Type and amount:no     Hearing Loss:  Hearing is good. denies any hearing loss    Vision Loss:   Wears glasses, contact lenses, or have any other visual impairment  yes    Adult Nutrition Screen:  No risk factors noted. Advance Care Planning:   End of Life Planning: has an advanced directive - a copy HAS NOT been provided. , has NO advanced directive  - add't info provided, reviewed DNR/DNI and patient is not interested  Gaudencio Mehtarajelaine Adams ACP-Facilitator appointment no      Medications/Allergies: Reviewed with patient  Prior to Admission medications    Medication Sig Start Date End Date Taking? Authorizing Provider   albuterol (PROAIR HFA) 90 mcg/actuation inhaler Take  by inhalation. Provider, Historical   methylPREDNISolone (MEDROL DOSEPACK) 4 mg tablet As directed. 2/21/20   Bill Alegre MD   lovastatin (MEVACOR) 20 mg tablet Take 20 mg by mouth every other day. Other, MD Grzegorz   pantoprazole (PROTONIX) 40 mg tablet Take 40 mg by mouth daily. Other, MD Grzegorz   lisinopril-hydrochlorothiazide (PRINZIDE, ZESTORETIC) 20-12.5 mg per tablet Take 1 Tab by mouth Daily (before breakfast). Provider, Historical   co-enzyme Q-10 (CO Q-10) 100 mg capsule Take 200 mg by mouth daily (after dinner). Provider, Historical   OTHER Take 2 Caps by mouth daily (after dinner). Juice Plus-Orchard Blend- Blended Fruit and Vegetable Juice Powders-takes 2 caps daily  Juice Plus Garden Blend-Blended fruit and vegetable juice powders-takes 2 caps daily    Provider, Historical       Allergies   Allergen Reactions    Clindamycin Diarrhea     \"It gave me C-Dif. \"    Statins-Hmg-Coa Reductase Inhibitors Other (comments)     \"It makes all of my joints ache. \"       Objective: There were no vitals taken for this visit. There is no height or weight on file to calculate BMI. Problem List: Reviewed with patient and discussed risk factors.     Patient Active Problem List   Diagnosis Code    Essential hypertension I10  Other hyperlipidemia E78.49       PSH: Reviewed with patient  Past Surgical History:   Procedure Laterality Date    HX BREAST BIOPSY Left 2005? Benign    HX COLONOSCOPY      HX DILATION AND CURETTAGE      for uterine polyp    HX ORTHOPAEDIC Left 2011    ORIF Left ankle        SH: Reviewed with patient  Social History     Tobacco Use    Smoking status: Never Smoker    Smokeless tobacco: Never Used   Substance Use Topics    Alcohol use: No    Drug use: No       FH: Reviewed with patient  Family History   Problem Relation Age of Onset    Stroke Mother     Colon Cancer Father        Current medical providers:    Patient Care Team:  Andrea Tripp MD as PCP - General (Internal Medicine)  Andrea Tripp MD as PCP [facility] Empaneled Provider  Davie Pichardo MD (Cardiology)   Erin Suarez MD  ( ophthalmology)    Plan:      Diagnoses and all orders for this visit:    Medicare annual wellness visit, subsequent  Immunization and Health screening discussed with her. Recommended getting Prevnar 13 vaccine but she does not want to go out due to COVID-19 concerns. She will get shingle vaccine in the fall. ACP (advance care planning)  She does have an Advanced directive at home. Asked for a copy for our records. She will bring a DNR form & copy of Advanced directive on her next appointment in the office. Health Maintenance   Topic Date Due    Hepatitis C Screening  1945    Lipid Screen  01/06/1956    DTaP/Tdap/Td series (1 - Tdap) 01/06/1967    Shingrix Vaccine Age 50> (1 of 2) 01/06/1996    Breast Cancer Screen Mammogram  01/06/1996    FOBT Q1Y Age 50-75  01/06/1996    GLAUCOMA SCREENING Q2Y  01/06/2011    Bone Densitometry (Dexa) Screening  01/06/2011    Pneumococcal 65+ years (1 of 1 - PPSV23) 01/06/2011    Influenza Age 9 to Adult  08/01/2020    Medicare Yearly Exam  05/02/2021          Urinary/ Fecal Incontinence: none.      Regular physical exercise:  Stays active , walks 2 miles a day     Patient verbalized understanding of information presented. AVS and Medicare Part B Preventive Services Table printed and given to pt and reviewed. See table for findings under Recommendation and Scheduled. All of the patient's questions were answered. Progress Note    Name: Matthew Mendez Date: 2020  Ethnicity: NON-  Race: WHITE OR   MRN: [id number]  Age: 77 y.o.  : 1945  Sex: Female       HPI:   Yovana Guerrero is a 77y.o. year old female who presents today to discuss chronic conditions as well. She went to the [facility] in  with severe low back pain which was radiating to her leg. She was  Told it was sciatica for which she took the medication & feeling much better now. While at [facility] she had a blood work done including fasting cholesterol. Her Total cholesterol was 216 & LDL was 122. She is on Mevacor and is not experiencing any bad side effects. She is wondering if she can lower the dose of Protonix which she has been getting from GI. 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 Her blood pressure readings are running in normal range. No side effects from medication. She does not want to get her labs done now as they were recently done at [facility].  She has h/o tinnitus and was seen by ENT who recommended taking Diazepam which she takes only as needed basis. Her symptoms have improved but few nights she start hearing noises in the ears. She takes 1/2 tablet of diazepam those nights & is asking for refill. Last refill was about 9 months ago.     .    Review of Systems   Constitutional: negative for fevers, chills and malaise  Eyes: negative for visual disturbance and redness  Ears, nose, mouth, throat, and face: negative for snoring and sore throat  Respiratory: negative for cough, wheezing or dyspnea on exertion  Cardiovascular: negative for chest pain, palpitations  Gastrointestinal: negative for reflux symptoms  Genitourinary:negative for frequency, nocturia and urinary incontinence  Musculoskeletal:negative for arthralgias and muscle weakness  Neurological: negative for headaches, dizziness and coordination problems  Behavioral/Psych: negative for anxiety and depression    Physical Examination   There were no vitals taken for this visit. General:  Alert, cooperative, no distress, appears stated age. Head:  Normocephalic, without obvious abnormality, atraumatic. Eyes:  Conjunctivae normal , no discharge. Nose: Nares normal.Septum midline. Mucosa normal. No drainage or sinus tenderness. Throat: Lips, mucosa, and tongue normal. Teeth and gums normal.   Neck: Supple, symmetrical, ROM nl.   Back:   Symmetric, no curvature. ROM normal. No CVA tenderness. Lungs:   No distress, effort normal.                           Extremities: Extremities normal, atraumatic, no cyanosis or edema. Skin: Skin color, texture, turgor normal. No rashes or lesions. Neurologic: Normal strength, speech normal..       Assessment/Plan   Diagnoses and all orders for this visit:    Benign paroxysmal positional vertigo due to bilateral vestibular disorder  -     diazePAM (Valium) 5 mg tablet; Take 1 Tab by mouth every twelve (12) hours as needed for Anxiety for up to 10 doses. Max Daily Amount: 10 mg., Normal, Disp-10 Tab, R-0    Essential hypertension  Well controlled on current dose of lisinopril/Hctz. Creatine & BMP was normal at [facility] blood work. Other hyperlipidemia  Continue Mevacor. Gastroesophageal reflux disease without esophagitis  Told her to take 1/2 a tablet of protonix & let GI know if she feels fine on the low dose. Tinnitus of both ears  -     diazePAM (Valium) 5 mg tablet; Take 1 Tab by mouth every twelve (12) hours as needed for Anxiety for up to 10 doses.  Max Daily Amount: 10 mg., Normal, Disp-10 Tab, R-0        Freda Yang MD  5/2/2020  12:54 PM

## 2020-05-21 ENCOUNTER — TELEPHONE (OUTPATIENT)
Dept: PRIMARY CARE CLINIC | Age: 75
End: 2020-05-21

## 2020-05-21 NOTE — TELEPHONE ENCOUNTER
Pt called and stated that the ambulance just came her. They told her to call her PCP as her BP was 210/109 and she is having a nosebleed. Please call pt back asap.

## 2020-05-21 NOTE — TELEPHONE ENCOUNTER
Call returned. Told her  To take B.p medication as she has not taken it yet. Recheck reading after an hour if still elevated I will see her tomorrow.

## 2020-05-22 ENCOUNTER — HOSPITAL ENCOUNTER (EMERGENCY)
Age: 75
Discharge: HOME | End: 2020-05-22
Attending: STUDENT IN AN ORGANIZED HEALTH CARE EDUCATION/TRAINING PROGRAM
Payer: MEDICARE

## 2020-05-22 VITALS
HEART RATE: 57 BPM | DIASTOLIC BLOOD PRESSURE: 84 MMHG | WEIGHT: 157.63 LBS | OXYGEN SATURATION: 96 % | TEMPERATURE: 98.6 F | SYSTOLIC BLOOD PRESSURE: 146 MMHG | RESPIRATION RATE: 16 BRPM | HEIGHT: 63 IN | BODY MASS INDEX: 27.93 KG/M2

## 2020-05-22 DIAGNOSIS — R03.0 ELEVATED BLOOD PRESSURE READING: ICD-10-CM

## 2020-05-22 DIAGNOSIS — R04.0 EPISTAXIS: Primary | ICD-10-CM

## 2020-05-22 PROCEDURE — 74011250637 HC RX REV CODE- 250/637: Performed by: STUDENT IN AN ORGANIZED HEALTH CARE EDUCATION/TRAINING PROGRAM

## 2020-05-22 PROCEDURE — 99284 EMERGENCY DEPT VISIT MOD MDM: CPT

## 2020-05-22 RX ORDER — CLONIDINE HYDROCHLORIDE 0.1 MG/1
0.2 TABLET ORAL
Status: COMPLETED | OUTPATIENT
Start: 2020-05-22 | End: 2020-05-22

## 2020-05-22 RX ADMIN — CLONIDINE HYDROCHLORIDE 0.2 MG: 0.1 TABLET ORAL at 11:40

## 2020-05-22 NOTE — DISCHARGE INSTRUCTIONS
Patient Education        Nosebleeds: Care Instructions  Your Care Instructions    Nosebleeds are common, especially if you have colds or allergies. Many things can cause a nosebleed. Some nosebleeds stop on their own with pressure. Others need packing. Some get cauterized (sealed). If you have gauze or other packing materials in your nose, you will need to follow up with your doctor to have the packing removed. You may need more treatment if you get nosebleeds a lot. The doctor has checked you carefully, but problems can develop later. If you notice any problems or new symptoms, get medical treatment right away. Follow-up care is a key part of your treatment and safety. Be sure to make and go to all appointments, and call your doctor if you are having problems. It's also a good idea to know your test results and keep a list of the medicines you take. How can you care for yourself at home? · If you get another nosebleed:  ? Sit up and tilt your head slightly forward. This keeps blood from going down your throat. ? Use your thumb and index finger to pinch your nose shut for 10 minutes. Use a clock. Do not check to see if the bleeding has stopped before the 10 minutes are up. If the bleeding has not stopped, pinch your nose shut for another 10 minutes. ? When the bleeding has stopped, try not to pick, rub, or blow your nose for 12 hours. Avoiding these things helps keep your nose from bleeding again. · If your doctor prescribed antibiotics, take them as directed. Do not stop taking them just because you feel better. You need to take the full course of antibiotics. To prevent nosebleeds  · Do not blow your nose too hard. · Try not to lift or strain after a nosebleed. · Raise your head on a pillow while you sleep. · Put a thin layer of a saline- or water-based nasal gel, such as NasoGel, inside your nose. Put it on the septum, which divides your nostrils.  This will prevent dryness that can cause nosebleeds. · Use a vaporizer or humidifier to add moisture to your bedroom. Follow the directions for cleaning the machine. · Do not use aspirin, ibuprofen (Advil, Motrin), or naproxen (Aleve) for 36 to 48 hours after a nosebleed unless your doctor tells you to. You can use acetaminophen (Tylenol) for pain relief. · Talk to your doctor about stopping any other medicines you are taking. Some medicines may make you more likely to get a nosebleed. · Do not use cold medicines or nasal sprays without first talking to your doctor. They can make your nose dry. When should you call for help? Call 911 anytime you think you may need emergency care. For example, call if:    · You passed out (lost consciousness).    Call your doctor now or seek immediate medical care if:    · You get another nosebleed and your nose is still bleeding after you have applied pressure 3 times for 10 minutes each time (30 minutes total).     · There is a lot of blood running down the back of your throat even after you pinch your nose and tilt your head forward.     · You have a fever.     · You have sinus pain.    Watch closely for changes in your health, and be sure to contact your doctor if:    · You get nosebleeds often, even if they stop.     · You do not get better as expected. Where can you learn more? Go to http://wai-fernando.info/  Enter S156 in the search box to learn more about \"Nosebleeds: Care Instructions. \"  Current as of: June 26, 2019Content Version: 12.4  © 3725-8133 Healthwise, Incorporated. Care instructions adapted under license by [employer] (which disclaims liability or warranty for this information). If you have questions about a medical condition or this instruction, always ask your healthcare professional. [place] [address] any warranty or liability for your use of this information.          Patient Education        Elevated Blood Pressure: Care Instructions  Your Care Instructions    Blood pressure is a measure of how hard the blood pushes against the walls of your arteries. It's normal for blood pressure to go up and down throughout the day. But if it stays up over time, you have high blood pressure. Two numbers tell you your blood pressure. The first number is the systolic pressure. It shows how hard the blood pushes when your heart is pumping. The second number is the diastolic pressure. It shows how hard the blood pushes between heartbeats, when your heart is relaxed and filling with blood. An ideal blood pressure in adults is less than 120/80 (say \"120 over 80\"). High blood pressure is 140/90 or higher. You have high blood pressure if your top number is 140 or higher or your bottom number is 90 or higher, or both. The main test for high blood pressure is simple, fast, and painless. To diagnose high blood pressure, your doctor will test your blood pressure at different times. After testing your blood pressure, your doctor may ask you to test it again when you are home. If you are diagnosed with high blood pressure, you can work with your doctor to make a long-term plan to manage it. Follow-up care is a key part of your treatment and safety. Be sure to make and go to all appointments, and call your doctor if you are having problems. It's also a good idea to know your test results and keep a list of the medicines you take. How can you care for yourself at home? · Do not smoke. Smoking increases your risk for heart attack and stroke. If you need help quitting, talk to your doctor about stop-smoking programs and medicines. These can increase your chances of quitting for good. · Stay at a healthy weight. · Try to limit how much sodium you eat to less than 2,300 milligrams (mg) a day. Your doctor may ask you to try to eat less than 1,500 mg a day. · Be physically active. Get at least 30 minutes of exercise on most days of the week. Walking is a good choice.  You also may want to do other activities, such as running, swimming, cycling, or playing tennis or team sports. · Avoid or limit alcohol. Talk to your doctor about whether you can drink any alcohol. · Eat plenty of fruits, vegetables, and low-fat dairy products. Eat less saturated and total fats. · Learn how to check your blood pressure at home. When should you call for help? Call your doctor now or seek immediate medical care if:  ? · Your blood pressure is much higher than normal (such as 180/110 or higher). ? · You think high blood pressure is causing symptoms such as:  ¨ Severe headache. ¨ Blurry vision. ? Watch closely for changes in your health, and be sure to contact your doctor if:  ? · You do not get better as expected. Where can you learn more? Go to http://wai-fernando.info/. Enter T701 in the search box to learn more about \"Elevated Blood Pressure: Care Instructions. \"  Current as of: September 21, 2016  Content Version: 11.4  © 2458-1824 Healthwise, Incorporated. Care instructions adapted under license by [employer] (which disclaims liability or warranty for this information). If you have questions about a medical condition or this instruction, always ask your healthcare professional. [address] any warranty or liability for your use of this information.

## 2020-05-22 NOTE — ED TRIAGE NOTES
Pt states that this is the second day she has had a morning nosebleed that lasts about 15 min. Pt has been struggling with her BP being high and she is on BP medication that she is taking as prescribed. Bleeding seems to have stopped currently.

## 2020-05-22 NOTE — ED NOTES
Verbal shift change report given to Emelyn Irizarry RN  (oncoming nurse) by Jesi Vogel RN (offgoing nurse). Report included the following information SBAR, ED Summary and MAR.

## 2020-05-22 NOTE — TELEPHONE ENCOUNTER
Confirmed patient id. States that she had nosebleed yesterday and bp was very high and she has another nose bleed now. She did not go to the ER yesterday advised her to go call ambulance and go to the ER. Patient gave verbal understanding.

## 2020-05-22 NOTE — ED PROVIDER NOTES
The history is provided by the patient and the EMS personnel. Epistaxis    This is a recurrent problem. The current episode started yesterday. The problem occurs daily (in the morning). The problem has been resolved. The problem is associated with nothing. The bleeding has been from the left nare. She has tried applying pressure for the symptoms. The treatment provided significant relief. Her past medical history is significant for HTN. Past medical history comments: not on any anticoagulants. Past Medical History:   Diagnosis Date    Adverse effect of anesthesia     Slow to awake from Anesthesia    Difficult intubation 12/11/15    required glidescope and bougie    Hypertension     Ill-defined condition     Tinnitus    Ill-defined condition 2011    Fractured Left ankle       Past Surgical History:   Procedure Laterality Date    HX BREAST BIOPSY Left 2005?     Benign    HX COLONOSCOPY      HX DILATION AND CURETTAGE      for uterine polyp    HX ORTHOPAEDIC Left 2011    ORIF Left ankle         Family History:   Problem Relation Age of Onset    Stroke Mother     Colon Cancer Father        Social History     Socioeconomic History    Marital status:      Spouse name: Not on file    Number of children: Not on file    Years of education: Not on file    Highest education level: Not on file   Occupational History    Not on file   Social Needs    Financial resource strain: Not on file    Food insecurity     Worry: Not on file     Inability: Not on file    Transportation needs     Medical: Not on file     Non-medical: Not on file   Tobacco Use    Smoking status: Never Smoker    Smokeless tobacco: Never Used   Substance and Sexual Activity    Alcohol use: No    Drug use: No    Sexual activity: Not on file   Lifestyle    Physical activity     Days per week: Not on file     Minutes per session: Not on file    Stress: Not on file   Relationships    Social connections     Talks on phone: Not on file     Gets together: Not on file     Attends Taoist service: Not on file     Active member of club or organization: Not on file     Attends meetings of clubs or organizations: Not on file     Relationship status: Not on file    Intimate partner violence     Fear of current or ex partner: Not on file     Emotionally abused: Not on file     Physically abused: Not on file     Forced sexual activity: Not on file   Other Topics Concern    Not on file   Social History Narrative    Not on file         ALLERGIES: Clindamycin and Statins-hmg-coa reductase inhibitors    Review of Systems   Constitutional: Negative for fever. HENT: Positive for nosebleeds. Negative for sinus pain. Respiratory: Negative for cough and shortness of breath. Cardiovascular: Negative for chest pain. Gastrointestinal: Negative for abdominal pain and vomiting. Skin: Negative for rash. Neurological: Negative for headaches. All other systems reviewed and are negative. Vitals:    05/22/20 1126 05/22/20 1130 05/22/20 1137   BP: 178/81 167/88 (!) 174/93   Pulse: 60  61   Resp: 16  18   Temp: 98.6 °F (37 °C)     SpO2: 96% 96% 98%   Weight: 71.5 kg (157 lb 10.1 oz)     Height: 5' 3\" (1.6 m)              Physical Exam  Constitutional:       General: She is not in acute distress. Appearance: She is well-developed. HENT:      Head: Normocephalic and atraumatic. Nose: No signs of injury. Right Nostril: No epistaxis or septal hematoma. Left Nostril: No epistaxis or septal hematoma. Comments: Dry mucosa medially, L > R, dried blood in L nare noted. Eyes:      Extraocular Movements: Extraocular movements intact. Conjunctiva/sclera: Conjunctivae normal.   Neck:      Musculoskeletal: Normal range of motion and neck supple. Cardiovascular:      Rate and Rhythm: Normal rate and regular rhythm. Pulmonary:      Effort: Pulmonary effort is normal. No respiratory distress.    Abdominal:      Palpations: Abdomen is soft. Tenderness: There is no abdominal tenderness. There is no guarding. Skin:     General: Skin is warm and dry. Neurological:      General: No focal deficit present. Mental Status: She is alert and oriented to person, place, and time. Motor: No abnormal muscle tone.           MDM       Procedures

## 2020-05-24 ENCOUNTER — TELEPHONE (OUTPATIENT)
Dept: PRIMARY CARE CLINIC | Age: 75
End: 2020-05-24

## 2020-05-24 DIAGNOSIS — H93.13 TINNITUS OF BOTH EARS: ICD-10-CM

## 2020-05-24 DIAGNOSIS — H81.13 BENIGN PAROXYSMAL POSITIONAL VERTIGO DUE TO BILATERAL VESTIBULAR DISORDER: ICD-10-CM

## 2020-05-24 RX ORDER — DIAZEPAM 5 MG/1
5 TABLET ORAL
Qty: 10 TAB | Refills: 0 | Status: SHIPPED | OUTPATIENT
Start: 2020-05-24 | End: 2021-04-30 | Stop reason: ALTCHOICE

## 2020-05-26 NOTE — TELEPHONE ENCOUNTER
Patient called over the weekend on the on-call line for high blood pressure. I recommended she double her current blood pressure medicine. I also refilled her Valium, and recommended she follow-up this week with Dr. Khoa Gates for her blood pressure.

## 2020-05-26 NOTE — TELEPHONE ENCOUNTER
LVM for patient to call and schedule appointment for in the office tomorrow with Dr Misti Barker.   Follow up from ER visit

## 2020-05-27 ENCOUNTER — OFFICE VISIT (OUTPATIENT)
Dept: PRIMARY CARE CLINIC | Age: 75
End: 2020-05-27

## 2020-05-27 VITALS
RESPIRATION RATE: 16 BRPM | SYSTOLIC BLOOD PRESSURE: 125 MMHG | HEIGHT: 63 IN | BODY MASS INDEX: 27.82 KG/M2 | TEMPERATURE: 98.1 F | OXYGEN SATURATION: 97 % | DIASTOLIC BLOOD PRESSURE: 80 MMHG | HEART RATE: 75 BPM | WEIGHT: 157 LBS

## 2020-05-27 DIAGNOSIS — R04.0 EPISTAXIS: ICD-10-CM

## 2020-05-27 DIAGNOSIS — F41.9 ANXIETY: ICD-10-CM

## 2020-05-27 DIAGNOSIS — I10 ESSENTIAL HYPERTENSION: Primary | ICD-10-CM

## 2020-05-27 RX ORDER — LISINOPRIL 40 MG/1
40 TABLET ORAL DAILY
Qty: 90 TAB | Refills: 0 | Status: SHIPPED | OUTPATIENT
Start: 2020-05-27 | End: 2020-08-23

## 2020-05-27 RX ORDER — ESCITALOPRAM OXALATE 5 MG/1
5 TABLET ORAL DAILY
Qty: 30 TAB | Refills: 0 | Status: SHIPPED | OUTPATIENT
Start: 2020-05-27 | End: 2020-06-22

## 2020-05-27 RX ORDER — HYDROCHLOROTHIAZIDE 12.5 MG/1
12.5 TABLET ORAL DAILY
Qty: 90 TAB | Refills: 0 | Status: SHIPPED | OUTPATIENT
Start: 2020-05-27 | End: 2020-08-13 | Stop reason: SDUPTHER

## 2020-05-27 NOTE — PROGRESS NOTES
Chief Complaint   Patient presents with    Follow-up     transaction of care from er visit     And states that she is very anxious and is having trouble

## 2020-05-27 NOTE — PROGRESS NOTES
Written by Tomasa Buckley, as dictated by Dr. Osmar Magaña MD.    Michelle Ribeiro is a 77 y.o. female. HPI      The patient presents today for a follow-up. Her BP today looks good at 125/80. She was previously getting high BP readings and had a reading of 210/113. She was in the [facility] ED on 2020 for a recurring nosebleed. Her nosebleeds lasted about 15 minutes each. She was told she had a broken capillary in her nose. Her BP in the ED was 178/81. She was given Clonidine in the ED which did help her but it has her fatigued. She has been taking Lisinopril-HCTZ 20-12.5 BID. She can not take Losartan because she believes it made her depressed. She is wondering if a glass of wine is safe to take with this medication    She takes Valium 2.5 mg as needed because she was not sleeping. She does take melatonin occasionally. She believes her anxiety is worse lately because her  recently  and her sister had [id number] and was on a ventilator. She has to go to the ENT to get her ears cleaned. She has to go to the dentist for a root canal.     She is compliant on Lovastatin 20 mg. Patient Active Problem List   Diagnosis Code    Essential hypertension I10    Other hyperlipidemia E78.49        Current Outpatient Medications on File Prior to Visit   Medication Sig Dispense Refill    diazePAM (Valium) 5 mg tablet Take 1 Tab by mouth every twelve (12) hours as needed for Anxiety for up to 10 doses. Max Daily Amount: 10 mg. 10 Tab 0    albuterol (PROAIR HFA) 90 mcg/actuation inhaler Take  by inhalation.  methylPREDNISolone (MEDROL DOSEPACK) 4 mg tablet As directed. 1 Dose Pack 0    lovastatin (MEVACOR) 20 mg tablet Take 20 mg by mouth every other day.  pantoprazole (PROTONIX) 40 mg tablet Take 40 mg by mouth daily.  lisinopril-hydrochlorothiazide (PRINZIDE, ZESTORETIC) 20-12.5 mg per tablet Take 1 Tab by mouth Daily (before breakfast).       co-enzyme Q-10 (CO Q-10) 100 mg capsule Take 200 mg by mouth daily (after dinner).  OTHER Take 2 Caps by mouth daily (after dinner). Juice Plus-Orchard Blend- Blended Fruit and Vegetable Juice Powders-takes 2 caps daily  Juice Plus Garden Blend-Blended fruit and vegetable juice powders-takes 2 caps daily       No current facility-administered medications on file prior to visit. Allergies   Allergen Reactions    Clindamycin Diarrhea     \"It gave me C-Dif. \"    Statins-Hmg-Coa Reductase Inhibitors Other (comments)     \"It makes all of my joints ache. \"       Past Medical History:   Diagnosis Date    Adverse effect of anesthesia     Slow to awake from Anesthesia    Difficult intubation 12/11/15    required glidescope and bougie    Hypertension     Ill-defined condition     Tinnitus    Ill-defined condition 2011    Fractured Left ankle       Past Surgical History:   Procedure Laterality Date    HX BREAST BIOPSY Left 2005?     Benign    HX COLONOSCOPY      HX DILATION AND CURETTAGE      for uterine polyp    HX ORTHOPAEDIC Left 2011    ORIF Left ankle       Family History   Problem Relation Age of Onset    Stroke Mother     Colon Cancer Father        Social History     Socioeconomic History    Marital status:      Spouse name: Not on file    Number of children: Not on file    Years of education: Not on file    Highest education level: Not on file   Occupational History    Not on file   Social Needs    Financial resource strain: Not on file    Food insecurity     Worry: Not on file     Inability: Not on file   [employer] needs     Medical: Not on file     Non-medical: Not on file   Tobacco Use    Smoking status: Never Smoker    Smokeless tobacco: Never Used   Substance and Sexual Activity    Alcohol use: No    Drug use: No    Sexual activity: Not on file   Lifestyle    Physical activity     Days per week: Not on file     Minutes per session: Not on file    Stress: Not on file Relationships    Social connections     Talks on phone: Not on file     Gets together: Not on file     Attends Congregational service: Not on file     Active member of club or organization: Not on file     Attends meetings of clubs or organizations: Not on file     Relationship status: Not on file    Intimate partner violence     Fear of current or ex partner: Not on file     Emotionally abused: Not on file     Physically abused: Not on file     Forced sexual activity: Not on file   Other Topics Concern    Not on file   Social History Narrative    Not on file       No visits with results within 3 Month(s) from this visit. Latest known visit with results is:   Hospital Outpatient Visit on 02/13/2020   Component Date Value Ref Range Status    Target HR 02/13/2020 146  bpm Final    Exercise duration time 02/13/2020 00:07:39   Final    Stress Base Systolic BP 39/94/9504 730  mmHg Final    Stress Base Diastolic BP 42/90/1697 74  mmHg Final    Post peak HR 02/13/2020 133  BPM Final    Baseline HR 02/13/2020 58  BPM Final    Estimated workload 02/13/2020 9.5  METS Final    Baseline BP 02/13/2020 143  mmHg Final    Percent HR 02/13/2020 91  % Final    Angina Index 02/13/2020 0   Final    Stress Base Diastolic BP 62/80/0378 80  mmHg Final    Stress Rate Pressure Product 02/13/2020 19,684  BPM*mmHg Final    ST Elevation (mm) 02/13/2020 0  mm Final    ST Depression (mm) 02/13/2020 0  mm Final    STRESS SR BULLARD TREADMILL SCORE 02/13/2020 0   Final     Review of Systems   Constitutional: Negative for malaise/fatigue. HENT: Negative for congestion. Eyes: Negative for blurred vision. Respiratory: Negative for shortness of breath. Cardiovascular: Negative for chest pain and leg swelling. Gastrointestinal: Negative for constipation, diarrhea and heartburn. Genitourinary: Negative for dysuria, frequency and urgency. Musculoskeletal: Negative for joint pain and myalgias.    Neurological: Negative for dizziness and headaches. Psychiatric/Behavioral: Negative for depression and substance abuse. The patient is not nervous/anxious and does not have insomnia. Visit Vitals  /80 (BP 1 Location: Left arm, BP Patient Position: Sitting)   Pulse 75   Temp 98.1 °F (36.7 °C) (Oral)   Resp 16   Ht 5' 3\" (1.6 m)   Wt 157 lb (71.2 kg)   SpO2 97%   BMI 27.81 kg/m²     Physical Exam  Vitals signs and nursing note reviewed. Constitutional:       General: She is not in acute distress. Appearance: She is not diaphoretic. HENT:      Head: Normocephalic and atraumatic. Right Ear: External ear normal.      Left Ear: External ear normal.   Eyes:      General:         Right eye: No discharge. Left eye: No discharge. Conjunctiva/sclera: Conjunctivae normal.      Pupils: Pupils are equal, round, and reactive to light. Neck:      Musculoskeletal: Normal range of motion and neck supple. Cardiovascular:      Rate and Rhythm: Normal rate and regular rhythm. Heart sounds: Normal heart sounds. No murmur. No friction rub. No gallop. Pulmonary:      Effort: Pulmonary effort is normal.      Breath sounds: Normal breath sounds. No wheezing. Abdominal:      General: Bowel sounds are normal.      Palpations: Abdomen is soft. Tenderness: There is no abdominal tenderness. Musculoskeletal: Normal range of motion. Neurological:      Mental Status: She is alert and oriented to person, place, and time. Psychiatric:         Behavior: Behavior normal.         Thought Content: Thought content normal.         ASSESSMENT and PLAN    ICD-10-CM ICD-9-CM    1. Essential hypertension I10 401.9 lisinopriL (PRINIVIL, ZESTRIL) 40 mg tablet sent to pharmacy     Lisinopril 40 mg prescribed. Potential side effects were discussed. Pt should take 1 tab daily       hydroCHLOROthiazide (HYDRODIURIL) 12.5 mg tablet sent to pharmacy     HCTZ 12.5 mg prescribed. Potential side effects were discussed.  Pt should take 1 tab daily    2. Epistaxis R04.0 784.7 Stable at this time   3. Anxiety F41.9 300.00 escitalopram oxalate (LEXAPRO) 5 mg tablet sent to pharmacy       Lexapro 5 mg prescribed. Potential side effects were discussed. Pt should take 1 tab daily     She should let me know how this medication is working for her. Pt is compliant on Valium as needed         This plan was reviewed with the patient and patient agrees. All questions were answered. This scribe documentation was reviewed by me and accurately reflects the examination and decisions made by me. This note will not be viewable in [address]

## 2020-06-11 DIAGNOSIS — E78.49 OTHER HYPERLIPIDEMIA: Primary | ICD-10-CM

## 2020-06-11 RX ORDER — LOVASTATIN 20 MG/1
20 TABLET ORAL EVERY OTHER DAY
Qty: 45 TAB | Refills: 0 | Status: SHIPPED | OUTPATIENT
Start: 2020-06-11 | End: 2020-09-08

## 2020-06-22 DIAGNOSIS — F41.9 ANXIETY: ICD-10-CM

## 2020-06-22 RX ORDER — ESCITALOPRAM OXALATE 5 MG/1
TABLET ORAL
Qty: 30 TAB | Refills: 0 | Status: SHIPPED | OUTPATIENT
Start: 2020-06-22 | End: 2020-07-23

## 2020-06-30 ENCOUNTER — TELEPHONE (OUTPATIENT)
Dept: PRIMARY CARE CLINIC | Age: 75
End: 2020-06-30

## 2020-06-30 NOTE — TELEPHONE ENCOUNTER
Pt said her former provider Warren Sahu at [facility] got his licensed suspended for misdiagnosis and she wants to know if she should get another  mammogram

## 2020-07-01 NOTE — TELEPHONE ENCOUNTER
Confirmed patient id and advised to check with the insurance. Patient states that she does not think she is having a problem but concerned and is due again in October Advised that she can get if insurance will pay and we will put in the order. Patient is going to check with insurance and let us know if she can do now.

## 2020-07-23 DIAGNOSIS — F41.9 ANXIETY: ICD-10-CM

## 2020-07-23 RX ORDER — ESCITALOPRAM OXALATE 5 MG/1
TABLET ORAL
Qty: 30 TAB | Refills: 0 | Status: SHIPPED | OUTPATIENT
Start: 2020-07-23 | End: 2020-08-18

## 2020-08-13 DIAGNOSIS — I10 ESSENTIAL HYPERTENSION: ICD-10-CM

## 2020-08-14 RX ORDER — HYDROCHLOROTHIAZIDE 12.5 MG/1
12.5 TABLET ORAL DAILY
Qty: 90 TAB | Refills: 0 | Status: SHIPPED | OUTPATIENT
Start: 2020-08-14 | End: 2020-09-28 | Stop reason: ALTCHOICE

## 2020-08-18 DIAGNOSIS — F41.9 ANXIETY: ICD-10-CM

## 2020-08-18 RX ORDER — ESCITALOPRAM OXALATE 5 MG/1
TABLET ORAL
Qty: 30 TAB | Refills: 0 | Status: SHIPPED | OUTPATIENT
Start: 2020-08-18 | End: 2020-09-14

## 2020-08-22 DIAGNOSIS — I10 ESSENTIAL HYPERTENSION: ICD-10-CM

## 2020-08-23 RX ORDER — LISINOPRIL 40 MG/1
TABLET ORAL
Qty: 90 TAB | Refills: 0 | Status: SHIPPED | OUTPATIENT
Start: 2020-08-23 | End: 2020-09-28 | Stop reason: DRUGHIGH

## 2020-09-08 DIAGNOSIS — E78.49 OTHER HYPERLIPIDEMIA: ICD-10-CM

## 2020-09-08 RX ORDER — LOVASTATIN 20 MG/1
TABLET ORAL
Qty: 45 TAB | Refills: 0 | Status: SHIPPED | OUTPATIENT
Start: 2020-09-08 | End: 2020-12-07 | Stop reason: SDUPTHER

## 2020-09-14 DIAGNOSIS — F41.9 ANXIETY: ICD-10-CM

## 2020-09-14 RX ORDER — ESCITALOPRAM OXALATE 5 MG/1
TABLET ORAL
Qty: 30 TAB | Refills: 0 | Status: SHIPPED | OUTPATIENT
Start: 2020-09-14 | End: 2020-10-11

## 2020-09-28 ENCOUNTER — OFFICE VISIT (OUTPATIENT)
Dept: PRIMARY CARE CLINIC | Age: 75
End: 2020-09-28
Payer: MEDICARE

## 2020-09-28 VITALS
WEIGHT: 162.2 LBS | HEIGHT: 63 IN | TEMPERATURE: 98.1 F | HEART RATE: 67 BPM | RESPIRATION RATE: 18 BRPM | DIASTOLIC BLOOD PRESSURE: 90 MMHG | SYSTOLIC BLOOD PRESSURE: 152 MMHG | OXYGEN SATURATION: 97 % | BODY MASS INDEX: 28.74 KG/M2

## 2020-09-28 DIAGNOSIS — Z12.39 BREAST CANCER SCREENING: ICD-10-CM

## 2020-09-28 DIAGNOSIS — E78.2 MIXED HYPERLIPIDEMIA: ICD-10-CM

## 2020-09-28 DIAGNOSIS — I10 ESSENTIAL HYPERTENSION: ICD-10-CM

## 2020-09-28 DIAGNOSIS — I10 ESSENTIAL HYPERTENSION: Primary | ICD-10-CM

## 2020-09-28 DIAGNOSIS — Z23 NEEDS FLU SHOT: ICD-10-CM

## 2020-09-28 DIAGNOSIS — Z12.11 COLON CANCER SCREENING: ICD-10-CM

## 2020-09-28 DIAGNOSIS — Z11.59 NEED FOR HEPATITIS C SCREENING TEST: ICD-10-CM

## 2020-09-28 DIAGNOSIS — Z23 NEED FOR VACCINATION AGAINST STREPTOCOCCUS PNEUMONIAE: ICD-10-CM

## 2020-09-28 PROBLEM — E78.49 OTHER HYPERLIPIDEMIA: Status: RESOLVED | Noted: 2020-02-14 | Resolved: 2020-09-28

## 2020-09-28 PROCEDURE — G8754 DIAS BP LESS 90: HCPCS | Performed by: INTERNAL MEDICINE

## 2020-09-28 PROCEDURE — 99214 OFFICE O/P EST MOD 30 MIN: CPT | Performed by: INTERNAL MEDICINE

## 2020-09-28 PROCEDURE — G8427 DOCREV CUR MEDS BY ELIG CLIN: HCPCS | Performed by: INTERNAL MEDICINE

## 2020-09-28 PROCEDURE — G8753 SYS BP > OR = 140: HCPCS | Performed by: INTERNAL MEDICINE

## 2020-09-28 PROCEDURE — G8536 NO DOC ELDER MAL SCRN: HCPCS | Performed by: INTERNAL MEDICINE

## 2020-09-28 PROCEDURE — 1090F PRES/ABSN URINE INCON ASSESS: CPT | Performed by: INTERNAL MEDICINE

## 2020-09-28 PROCEDURE — 90694 VACC AIIV4 NO PRSRV 0.5ML IM: CPT | Performed by: INTERNAL MEDICINE

## 2020-09-28 PROCEDURE — G8399 PT W/DXA RESULTS DOCUMENT: HCPCS | Performed by: INTERNAL MEDICINE

## 2020-09-28 PROCEDURE — G0009 ADMIN PNEUMOCOCCAL VACCINE: HCPCS | Performed by: INTERNAL MEDICINE

## 2020-09-28 PROCEDURE — 1101F PT FALLS ASSESS-DOCD LE1/YR: CPT | Performed by: INTERNAL MEDICINE

## 2020-09-28 PROCEDURE — G8510 SCR DEP NEG, NO PLAN REQD: HCPCS | Performed by: INTERNAL MEDICINE

## 2020-09-28 PROCEDURE — G8419 CALC BMI OUT NRM PARAM NOF/U: HCPCS | Performed by: INTERNAL MEDICINE

## 2020-09-28 PROCEDURE — G0008 ADMIN INFLUENZA VIRUS VAC: HCPCS | Performed by: INTERNAL MEDICINE

## 2020-09-28 PROCEDURE — 3017F COLORECTAL CA SCREEN DOC REV: CPT | Performed by: INTERNAL MEDICINE

## 2020-09-28 PROCEDURE — 90732 PPSV23 VACC 2 YRS+ SUBQ/IM: CPT | Performed by: INTERNAL MEDICINE

## 2020-09-28 RX ORDER — LOSARTAN POTASSIUM AND HYDROCHLOROTHIAZIDE 12.5; 1 MG/1; MG/1
TABLET ORAL
Qty: 90 TAB | Refills: 0 | Status: SHIPPED | OUTPATIENT
Start: 2020-09-28 | End: 2020-10-14 | Stop reason: ALTCHOICE

## 2020-09-28 RX ORDER — LOSARTAN POTASSIUM AND HYDROCHLOROTHIAZIDE 12.5; 1 MG/1; MG/1
1 TABLET ORAL DAILY
Qty: 30 TAB | Refills: 0 | Status: SHIPPED | OUTPATIENT
Start: 2020-09-28 | End: 2020-09-28

## 2020-09-28 NOTE — PROGRESS NOTES
Written by Sharon Valdez, as dictated by Dr. Sarwat Conti MD.    Rossy Crane is a 77 y.o. female. HPI  Pt presents today for routine care follow-up and vaccinations. Pt's BP is elevated today in office at 155/83, 152/90 on manual repeat in R arm while sitting down. She is taking lisinopril and HCTZ every day. She does not check her BP often at home but notes it is typically similar to what it was in office today. She takes lovastatin 10 mg every day and has recently been trying to eat more fruits and vegetables. She has also been walking 2 miles every day for exercise. She takes Lexapro every day and is feeling like it is helping her anxiety a lot. She had been getting her mammograms done at the [facility] for a few years, and she inquires if she can have a new mammogram done here. Her last colonoscopy was a little over a year ago. Her last eye exam was a few months ago and was good besides some cataracts. Patient Active Problem List   Diagnosis Code    Essential hypertension I10        Current Outpatient Medications on File Prior to Visit   Medication Sig Dispense Refill    escitalopram oxalate (LEXAPRO) 5 mg tablet TAKE 1 TABLET BY MOUTH DAILY 30 Tab 0    lovastatin (MEVACOR) 20 mg tablet TAKE 1 TABLET BY MOUTH EVERY OTHER DAY 45 Tab 0    diazePAM (Valium) 5 mg tablet Take 1 Tab by mouth every twelve (12) hours as needed for Anxiety for up to 10 doses. Max Daily Amount: 10 mg. 10 Tab 0    albuterol (PROAIR HFA) 90 mcg/actuation inhaler Take  by inhalation.  pantoprazole (PROTONIX) 40 mg tablet Take 40 mg by mouth daily.  co-enzyme Q-10 (CO Q-10) 100 mg capsule Take 200 mg by mouth daily (after dinner).  OTHER Take 2 Caps by mouth daily (after dinner).  Juice Plus-Orchard Blend- Blended Fruit and Vegetable Juice Powders-takes 2 caps daily  Juice Plus Garden Blend-Blended fruit and vegetable juice powders-takes 2 caps daily  [DISCONTINUED] lisinopriL (PRINIVIL, ZESTRIL) 40 mg tablet TAKE 1 TABLET BY MOUTH DAILY 90 Tab 0    [DISCONTINUED] hydroCHLOROthiazide (HYDRODIURIL) 12.5 mg tablet Take 1 Tab by mouth daily for 90 days. 90 Tab 0     No current facility-administered medications on file prior to visit. Allergies   Allergen Reactions    Clindamycin Diarrhea     \"It gave me C-Dif. \"    Statins-Hmg-Coa Reductase Inhibitors Other (comments)     \"It makes all of my joints ache. \"       Past Medical History:   Diagnosis Date    Adverse effect of anesthesia     Slow to awake from Anesthesia    Difficult intubation 12/11/15    required glidescope and bougie    Hypertension     Ill-defined condition     Tinnitus    Ill-defined condition 2011    Fractured Left ankle       Past Surgical History:   Procedure Laterality Date    HX BREAST BIOPSY Left 2005?     Benign    HX COLONOSCOPY      HX DILATION AND CURETTAGE      for uterine polyp    HX ORTHOPAEDIC Left 2011    ORIF Left ankle       Family History   Problem Relation Age of Onset    Stroke Mother     Colon Cancer Father        Social History     Socioeconomic History    Marital status:      Spouse name: Not on file    Number of children: Not on file    Years of education: Not on file    Highest education level: Not on file   Occupational History    Not on file   Social Needs    Financial resource strain: Not on file    Food insecurity     Worry: Not on file     Inability: Not on file    Transportation needs     Medical: Not on file     Non-medical: Not on file   Tobacco Use    Smoking status: Never Smoker    Smokeless tobacco: Never Used   Substance and Sexual Activity    Alcohol use: No    Drug use: No    Sexual activity: Not on file   Lifestyle    Physical activity     Days per week: Not on file     Minutes per session: Not on file    Stress: Not on file   Relationships    Social connections     Talks on phone: Not on file     Gets together: Not on file     Attends [employer] service: Not on file     Active member of club or organization: Not on file     Attends meetings of clubs or organizations: Not on file     Relationship status: Not on file    Intimate partner violence     Fear of current or ex partner: Not on file     Emotionally abused: Not on file     Physically abused: Not on file     Forced sexual activity: Not on file   Other Topics Concern    Not on file   Social History Narrative    Not on file       No visits with results within 3 Month(s) from this visit. Latest known visit with results is:   Hospital Outpatient Visit on 02/13/2020   Component Date Value Ref Range Status    Target HR 02/13/2020 146  bpm Final    Exercise duration time 02/13/2020 00:07:39   Final    Stress Base Systolic BP 59/73/1857 096  mmHg Final    Stress Base Diastolic BP 94/34/6904 74  mmHg Final    Post peak HR 02/13/2020 133  BPM Final    Baseline HR 02/13/2020 58  BPM Final    Estimated workload 02/13/2020 9.5  METS Final    Baseline BP 02/13/2020 143  mmHg Final    Percent HR 02/13/2020 91  % Final    Angina Index 02/13/2020 0   Final    Stress Base Diastolic BP 67/44/4249 80  mmHg Final    Stress Rate Pressure Product 02/13/2020 19,684  BPM*mmHg Final    ST Elevation (mm) 02/13/2020 0  mm Final    ST Depression (mm) 02/13/2020 0  mm Final    STRESS SR BULLARD TREADMILL SCORE 02/13/2020 0   Final     Review of Systems   Constitutional: Negative for malaise/fatigue and weight loss. HENT: Negative for congestion and sore throat. Eyes: Negative for blurred vision. Respiratory: Negative for cough and shortness of breath. Cardiovascular: Negative for chest pain and leg swelling. Gastrointestinal: Negative for constipation and heartburn. Genitourinary: Negative for frequency and urgency. Musculoskeletal: Negative for back pain, joint pain and myalgias. Neurological: Negative for dizziness and headaches.    Psychiatric/Behavioral: Negative for depression. The patient is not nervous/anxious and does not have insomnia. Visit Vitals  BP (!) 152/90 (BP 1 Location: Right arm, BP Patient Position: Sitting)   Pulse 67   Temp 98.1 °F (36.7 °C) (Oral)   Resp 18   Ht 5' 3\" (1.6 m)   Wt 162 lb 3.2 oz (73.6 kg)   SpO2 97%   BMI 28.73 kg/m²     Physical Exam  Vitals signs and nursing note reviewed. Constitutional:       General: She is not in acute distress. Appearance: Normal appearance. She is well-developed, well-groomed and overweight. She is not diaphoretic. HENT:      Right Ear: External ear normal.      Left Ear: External ear normal.   Eyes:      General: No scleral icterus. Right eye: No discharge. Left eye: No discharge. Extraocular Movements: Extraocular movements intact. Neck:      Musculoskeletal: Normal range of motion and neck supple. Cardiovascular:      Rate and Rhythm: Normal rate and regular rhythm. Pulmonary:      Effort: Pulmonary effort is normal.      Breath sounds: Normal breath sounds. No wheezing. Musculoskeletal:      Right lower leg: No edema. Left lower leg: No edema. Lymphadenopathy:      Cervical: No cervical adenopathy. Neurological:      Mental Status: She is alert and oriented to person, place, and time. Psychiatric:         Mood and Affect: Mood and affect normal.         Behavior: Behavior normal.       ASSESSMENT and PLAN    ICD-10-CM ICD-9-CM    1. Essential hypertension  I10 401.9 losartan-hydroCHLOROthiazide (HYZAAR) 100-12.5 mg per tablet sent to pharmacy. Potential side effects were discussed. I prescribed Hyzaar and instructed her to take it UID and check her BP regularly. She should check her BP regularly and aim for a BP of 130/80. If this new medication is working well for her, she should let me know and I will send her a 90-day supply.       2. Mixed hyperlipidemia  S90.8 319.3 METABOLIC PANEL, COMPREHENSIVE      LIPID PANEL      CBC W/O DIFF     Ordered fasting labs for pt to complete today in office. Waiting on results. 3. Need for hepatitis C screening test  Z11.59 V73.89 HEPATITIS C AB    Labs drawn in office today. 4. Breast cancer screening  Z12.39 V76.10 MELISA 3D FADI W MAMMO BI SCREENING INCL CAD    I ordered a mammogram for her to have done today. 5. Needs flu shot  Z23 V04.81 Flu vaccine administered in office today. Pt tolerated well. 6. Need for vaccination against Streptococcus pneumoniae  Z23 V03.82 PNEUMOCOCCAL POLYSACCHARIDE VACCINE, 23-VALENT, ADULT OR IMMUNOSUPPRESSED PT DOSE, administered in office today. Pt tolerated well. 7. Colon cancer screening  Z12.11 V76.51 OCCULT BLOOD IMMUNOASSAY,DIAGNOSTIC    I ordered a stool sample for health maintenance. This plan was reviewed with the patient and patient agrees. All questions were answered. This scribe documentation was reviewed by me and accurately reflects the examination and decisions made by me. This note will not be viewable in [address]

## 2020-09-28 NOTE — PROGRESS NOTES
Chief Complaint   Patient presents with    Follow-up    Immunization/Injection     innfluenza/pneumonia

## 2020-10-01 LAB
ALBUMIN SERPL-MCNC: 3.6 G/DL (ref 3.5–5)
ALBUMIN/GLOB SERPL: 1.1 {RATIO} (ref 1.1–2.2)
ALP SERPL-CCNC: 70 U/L (ref 45–117)
ALT SERPL-CCNC: 18 U/L (ref 12–78)
ANION GAP SERPL CALC-SCNC: 7 MMOL/L (ref 5–15)
AST SERPL-CCNC: 18 U/L (ref 15–37)
BILIRUB SERPL-MCNC: 0.7 MG/DL (ref 0.2–1)
BUN SERPL-MCNC: 19 MG/DL (ref 6–20)
BUN/CREAT SERPL: 26 (ref 12–20)
CALCIUM SERPL-MCNC: 9.1 MG/DL (ref 8.5–10.1)
CHLORIDE SERPL-SCNC: 106 MMOL/L (ref 97–108)
CHOLEST SERPL-MCNC: 204 MG/DL
CO2 SERPL-SCNC: 28 MMOL/L (ref 21–32)
CREAT SERPL-MCNC: 0.73 MG/DL (ref 0.55–1.02)
ERYTHROCYTE [DISTWIDTH] IN BLOOD BY AUTOMATED COUNT: 12.9 % (ref 11.5–14.5)
GLOBULIN SER CALC-MCNC: 3.4 G/DL (ref 2–4)
GLUCOSE SERPL-MCNC: 85 MG/DL (ref 65–100)
HCT VFR BLD AUTO: 42 % (ref 35–47)
HCV AB SERPL QL IA: NONREACTIVE
HCV COMMENT,HCGAC: NORMAL
HDLC SERPL-MCNC: 75 MG/DL
HDLC SERPL: 2.7 {RATIO} (ref 0–5)
HGB BLD-MCNC: 13.2 G/DL (ref 11.5–16)
LDLC SERPL CALC-MCNC: 111.4 MG/DL (ref 0–100)
LIPID PROFILE,FLP: ABNORMAL
MCH RBC QN AUTO: 29.9 PG (ref 26–34)
MCHC RBC AUTO-ENTMCNC: 31.4 G/DL (ref 30–36.5)
MCV RBC AUTO: 95 FL (ref 80–99)
NRBC # BLD: 0 K/UL (ref 0–0.01)
NRBC BLD-RTO: 0 PER 100 WBC
PLATELET # BLD AUTO: 239 K/UL (ref 150–400)
PMV BLD AUTO: 11.8 FL (ref 8.9–12.9)
POTASSIUM SERPL-SCNC: 4 MMOL/L (ref 3.5–5.1)
PROT SERPL-MCNC: 7 G/DL (ref 6.4–8.2)
RBC # BLD AUTO: 4.42 M/UL (ref 3.8–5.2)
SODIUM SERPL-SCNC: 141 MMOL/L (ref 136–145)
TRIGL SERPL-MCNC: 88 MG/DL (ref ?–150)
VLDLC SERPL CALC-MCNC: 17.6 MG/DL
WBC # BLD AUTO: 5.7 K/UL (ref 3.6–11)

## 2020-10-01 NOTE — PROGRESS NOTES
Jo Rehman, it was nice seeing you in the office. Your blood report is back and cholesterol numbers are slightly up. Are you taking your medication daily?

## 2020-10-05 NOTE — PROGRESS NOTES
Dorothy Justin, hope you are doing well. Have you seen my comments under your blood report. I have sent you earlier.

## 2020-10-06 ENCOUNTER — TRANSCRIBE ORDER (OUTPATIENT)
Dept: SCHEDULING | Age: 75
End: 2020-10-06

## 2020-10-06 DIAGNOSIS — Z12.31 VISIT FOR SCREENING MAMMOGRAM: Primary | ICD-10-CM

## 2020-10-09 DIAGNOSIS — I10 ESSENTIAL HYPERTENSION: Primary | ICD-10-CM

## 2020-10-09 RX ORDER — LISINOPRIL AND HYDROCHLOROTHIAZIDE 12.5; 2 MG/1; MG/1
1 TABLET ORAL
Qty: 90 TAB | Refills: 0 | OUTPATIENT
Start: 2020-10-09 | End: 2021-01-07

## 2020-10-09 NOTE — TELEPHONE ENCOUNTER
Patient's bp med was upped on dosage due to it being high last week. Now is getting good readings and doesn't think it needs ot be increased.  Would like a renewal of the lisinopril hctz

## 2020-10-09 NOTE — TELEPHONE ENCOUNTER
Last office visit 9/28/2020  Last med refill 8/23/2020    Placed on losartan and patient thinks that his bp is better and wants lisinopril

## 2020-10-11 DIAGNOSIS — F41.9 ANXIETY: ICD-10-CM

## 2020-10-11 RX ORDER — ESCITALOPRAM OXALATE 5 MG/1
TABLET ORAL
Qty: 30 TAB | Refills: 0 | Status: SHIPPED | OUTPATIENT
Start: 2020-10-11 | End: 2020-11-10

## 2020-10-14 DIAGNOSIS — I10 ESSENTIAL HYPERTENSION: Primary | ICD-10-CM

## 2020-10-14 RX ORDER — HYDROCHLOROTHIAZIDE 12.5 MG/1
12.5 TABLET ORAL DAILY
Qty: 90 TAB | Refills: 0 | Status: SHIPPED | OUTPATIENT
Start: 2020-10-14 | End: 2020-11-13 | Stop reason: SDUPTHER

## 2020-10-14 RX ORDER — LISINOPRIL 40 MG/1
40 TABLET ORAL DAILY
Qty: 90 TAB | Refills: 0 | Status: SHIPPED | OUTPATIENT
Start: 2020-10-14 | End: 2021-02-08 | Stop reason: SDUPTHER

## 2020-11-06 ENCOUNTER — HOSPITAL ENCOUNTER (OUTPATIENT)
Dept: MAMMOGRAPHY | Age: 75
Discharge: HOME | End: 2020-11-06
Attending: INTERNAL MEDICINE
Payer: MEDICARE

## 2020-11-06 DIAGNOSIS — Z12.31 VISIT FOR SCREENING MAMMOGRAM: ICD-10-CM

## 2020-11-06 PROCEDURE — 77063 BREAST TOMOSYNTHESIS BI: CPT

## 2020-11-10 DIAGNOSIS — F41.9 ANXIETY: ICD-10-CM

## 2020-11-10 RX ORDER — ESCITALOPRAM OXALATE 5 MG/1
TABLET ORAL
Qty: 30 TAB | Refills: 0 | Status: SHIPPED | OUTPATIENT
Start: 2020-11-10 | End: 2020-12-11 | Stop reason: SDUPTHER

## 2020-11-13 DIAGNOSIS — I10 ESSENTIAL HYPERTENSION: ICD-10-CM

## 2020-11-16 RX ORDER — HYDROCHLOROTHIAZIDE 12.5 MG/1
12.5 TABLET ORAL DAILY
Qty: 90 TAB | Refills: 0 | Status: SHIPPED | OUTPATIENT
Start: 2020-11-16 | End: 2021-05-25 | Stop reason: SDUPTHER

## 2020-12-07 DIAGNOSIS — E78.49 OTHER HYPERLIPIDEMIA: ICD-10-CM

## 2020-12-07 RX ORDER — LOVASTATIN 20 MG/1
TABLET ORAL
Qty: 45 TAB | Refills: 0 | Status: SHIPPED | OUTPATIENT
Start: 2020-12-07 | End: 2021-03-10

## 2020-12-07 NOTE — TELEPHONE ENCOUNTER
----- Message from [employer] sent at 12/7/2020 12:26 PM EST -----  Regarding: /Refill  Contact: [phone]  Medication Refill    Caller (if not patient):Flores       Relationship of caller (if not patient):pharmacy       Best contact number(s):[phone]        Name of medication and dosage if known:lovastatin (MEVACOR) 20 mg tablet       Is patient out of this medication (yes/no):unsure      Pharmacy name:[facility]    Pharmacy listed in chart? (yes/no):yes  Pharmacy phone number:n/a      Details to clarify the request:please advise      [employer]

## 2020-12-11 DIAGNOSIS — F41.9 ANXIETY: ICD-10-CM

## 2020-12-11 RX ORDER — ESCITALOPRAM OXALATE 5 MG/1
TABLET ORAL
Qty: 30 TAB | Refills: 0 | Status: SHIPPED | OUTPATIENT
Start: 2020-12-11 | End: 2021-01-10

## 2021-01-09 DIAGNOSIS — F41.9 ANXIETY: ICD-10-CM

## 2021-01-10 RX ORDER — ESCITALOPRAM OXALATE 5 MG/1
TABLET ORAL
Qty: 30 TAB | Refills: 0 | Status: SHIPPED | OUTPATIENT
Start: 2021-01-10 | End: 2021-02-08

## 2021-02-08 DIAGNOSIS — F41.9 ANXIETY: ICD-10-CM

## 2021-02-08 DIAGNOSIS — I10 ESSENTIAL HYPERTENSION: ICD-10-CM

## 2021-02-08 RX ORDER — LISINOPRIL 40 MG/1
40 TABLET ORAL DAILY
Qty: 90 TAB | Refills: 0 | Status: SHIPPED | OUTPATIENT
Start: 2021-02-08 | End: 2021-05-09

## 2021-02-08 RX ORDER — ESCITALOPRAM OXALATE 5 MG/1
TABLET ORAL
Qty: 30 TAB | Refills: 0 | Status: SHIPPED | OUTPATIENT
Start: 2021-02-08 | End: 2021-03-10

## 2021-03-10 DIAGNOSIS — E78.49 OTHER HYPERLIPIDEMIA: ICD-10-CM

## 2021-03-10 DIAGNOSIS — F41.9 ANXIETY: ICD-10-CM

## 2021-03-10 RX ORDER — LOVASTATIN 20 MG/1
TABLET ORAL
Qty: 45 TAB | Refills: 0 | Status: SHIPPED | OUTPATIENT
Start: 2021-03-10 | End: 2021-06-08

## 2021-03-10 RX ORDER — ESCITALOPRAM OXALATE 5 MG/1
TABLET ORAL
Qty: 30 TAB | Refills: 0 | Status: SHIPPED | OUTPATIENT
Start: 2021-03-10 | End: 2021-04-10

## 2021-04-09 DIAGNOSIS — F41.9 ANXIETY: ICD-10-CM

## 2021-04-10 RX ORDER — ESCITALOPRAM OXALATE 5 MG/1
TABLET ORAL
Qty: 30 TAB | Refills: 0 | Status: SHIPPED | OUTPATIENT
Start: 2021-04-10 | End: 2021-05-09

## 2021-04-30 ENCOUNTER — TELEPHONE (OUTPATIENT)
Dept: PRIMARY CARE CLINIC | Age: 76
End: 2021-04-30

## 2021-04-30 DIAGNOSIS — F41.9 ANXIETY: Primary | ICD-10-CM

## 2021-04-30 RX ORDER — DIAZEPAM 5 MG/1
5 TABLET ORAL
Qty: 10 TAB | Refills: 0 | Status: SHIPPED | OUTPATIENT
Start: 2021-04-30 | End: 2021-06-12

## 2021-04-30 NOTE — TELEPHONE ENCOUNTER
Patient calling stating she recently received bad news. Patient would like some medication for sleeping.

## 2021-04-30 NOTE — TELEPHONE ENCOUNTER
Returned call to patient. She stated that she would like something short term to help her sleep.  Because she is having trouble sleeping  This past week due to bad news she received

## 2021-05-09 DIAGNOSIS — I10 ESSENTIAL HYPERTENSION: ICD-10-CM

## 2021-05-09 DIAGNOSIS — F41.9 ANXIETY: ICD-10-CM

## 2021-05-09 RX ORDER — ESCITALOPRAM OXALATE 5 MG/1
TABLET ORAL
Qty: 30 TAB | Refills: 0 | Status: SHIPPED | OUTPATIENT
Start: 2021-05-09 | End: 2021-06-08

## 2021-05-09 RX ORDER — LISINOPRIL 40 MG/1
TABLET ORAL
Qty: 90 TAB | Refills: 0 | Status: SHIPPED | OUTPATIENT
Start: 2021-05-09 | End: 2021-08-07

## 2021-06-08 DIAGNOSIS — E78.49 OTHER HYPERLIPIDEMIA: ICD-10-CM

## 2021-06-08 DIAGNOSIS — G89.29 CHRONIC MIDLINE LOW BACK PAIN WITHOUT SCIATICA: Primary | ICD-10-CM

## 2021-06-08 DIAGNOSIS — F41.9 ANXIETY: ICD-10-CM

## 2021-06-08 DIAGNOSIS — M54.50 CHRONIC MIDLINE LOW BACK PAIN WITHOUT SCIATICA: Primary | ICD-10-CM

## 2021-06-08 RX ORDER — LOVASTATIN 20 MG/1
TABLET ORAL
Qty: 45 TABLET | Refills: 0 | Status: SHIPPED | OUTPATIENT
Start: 2021-06-08 | End: 2021-08-31

## 2021-06-08 RX ORDER — METHYLPREDNISOLONE 4 MG/1
TABLET ORAL
Qty: 1 DOSE PACK | Refills: 0 | Status: SHIPPED | OUTPATIENT
Start: 2021-06-08 | End: 2021-06-16 | Stop reason: ALTCHOICE

## 2021-06-08 RX ORDER — ESCITALOPRAM OXALATE 5 MG/1
TABLET ORAL
Qty: 30 TABLET | Refills: 0 | Status: SHIPPED | OUTPATIENT
Start: 2021-06-08 | End: 2021-07-08

## 2021-06-12 ENCOUNTER — HOSPITAL ENCOUNTER (EMERGENCY)
Age: 76
Discharge: HOME | End: 2021-06-12
Attending: EMERGENCY MEDICINE | Admitting: EMERGENCY MEDICINE
Payer: MEDICARE

## 2021-06-12 VITALS
RESPIRATION RATE: 16 BRPM | HEIGHT: 63 IN | TEMPERATURE: 98.8 F | HEART RATE: 58 BPM | DIASTOLIC BLOOD PRESSURE: 68 MMHG | WEIGHT: 161.38 LBS | SYSTOLIC BLOOD PRESSURE: 156 MMHG | OXYGEN SATURATION: 97 % | BODY MASS INDEX: 28.59 KG/M2

## 2021-06-12 DIAGNOSIS — M54.31 SCIATICA OF RIGHT SIDE: Primary | ICD-10-CM

## 2021-06-12 PROCEDURE — 74011250636 HC RX REV CODE- 250/636: Performed by: EMERGENCY MEDICINE

## 2021-06-12 PROCEDURE — 99283 EMERGENCY DEPT VISIT LOW MDM: CPT

## 2021-06-12 PROCEDURE — 96372 THER/PROPH/DIAG INJ SC/IM: CPT

## 2021-06-12 RX ORDER — CYCLOBENZAPRINE HCL 10 MG
10 TABLET ORAL
Qty: 18 TABLET | Refills: 0 | Status: SHIPPED | OUTPATIENT
Start: 2021-06-12 | End: 2021-06-16 | Stop reason: SDUPTHER

## 2021-06-12 RX ORDER — KETOROLAC TROMETHAMINE 30 MG/ML
30 INJECTION, SOLUTION INTRAMUSCULAR; INTRAVENOUS
Status: COMPLETED | OUTPATIENT
Start: 2021-06-12 | End: 2021-06-12

## 2021-06-12 RX ORDER — NAPROXEN 500 MG/1
500 TABLET ORAL
Qty: 20 TABLET | Refills: 0 | Status: SHIPPED | OUTPATIENT
Start: 2021-06-12 | End: 2021-06-18 | Stop reason: ALTCHOICE

## 2021-06-12 RX ADMIN — KETOROLAC TROMETHAMINE 30 MG: 30 INJECTION, SOLUTION INTRAMUSCULAR at 18:02

## 2021-06-12 NOTE — ED PROVIDER NOTES
HPI     78yo F with hx of HTN, sciatica presenting with sciatica pain. She reports she was at the beach this week and 5 days ago began having worsening pain in her hip/buttock radiating to the foot. Pain comes and goes, is worse with ambulation, comes on very sharp and sudden and causes her to feel like she may fall, then she has some tingling in her foot that gradually resolves. She said it was starting on the LLE, was mild, now in the RLE and is more severe. Denies any falls or trauma. Dneies midline back pain. Denies abdominal pain, n/v, dysuria, difficulties urinating, saddle anesthesia, weakness into her legs. Says she has had similar sx in 2020, [facility] evaluation [facility], was treated with steroids and diclofenac with improvement. She called her PCP 5 days ago, was prescribed medrol dosepack which she has been taking. She started taking the diclofenac again and some tylenol. Denies hx of malignancy, denies fevers chills or sweats. Past Medical History:   Diagnosis Date    Adverse effect of anesthesia     Slow to awake from Anesthesia    Difficult intubation 12/11/15    required glidescope and bougie    Hypertension     Ill-defined condition     Tinnitus    Ill-defined condition 2011    Fractured Left ankle       Past Surgical History:   Procedure Laterality Date    HX BREAST BIOPSY Left 2005?     Benign    HX COLONOSCOPY      HX DILATION AND CURETTAGE      for uterine polyp    HX GI      hernia    HX HEENT      cataract     HX ORTHOPAEDIC Left 2011    ORIF Left ankle         Family History:   Problem Relation Age of Onset    Stroke Mother     Colon Cancer Father     Breast Cancer Paternal Grandmother        Social History     Socioeconomic History    Marital status:      Spouse name: Not on file    Number of children: Not on file    Years of education: Not on file    Highest education level: Not on file   Occupational History    Not on file   Tobacco Use    Smoking status: Never Smoker    Smokeless tobacco: Never Used   Substance and Sexual Activity    Alcohol use: No    Drug use: No    Sexual activity: Not on file   Other Topics Concern    Not on file   Social History Narrative    Not on file     Social Determinants of Health     Financial Resource Strain:     Difficulty of Paying Living Expenses:    Food Insecurity:     Worried About Running Out of Food in the Last Year:     [address] in the Last Year:    Transportation Needs:     Lack of Transportation (Medical):  Lack of Transportation (Non-Medical):    Physical Activity:     Days of Exercise per Week:     Minutes of Exercise per Session:    Stress:     Feeling of Stress :    Social Connections:     Frequency of Communication with Friends and Family:     Frequency of Social Gatherings with Friends and Family:     Attends Druze Services:     Active Member of Clubs or Organizations:     Attends Club or Organization Meetings:     Marital Status:    Intimate Partner Violence:     Fear of Current or Ex-Partner:     Emotionally Abused:     Physically Abused:     Sexually Abused: ALLERGIES: Clindamycin and Statins-hmg-coa reductase inhibitors    Review of Systems   Constitutional: Negative for chills and fever. Respiratory: Negative for cough and shortness of breath. Cardiovascular: Negative for chest pain and leg swelling. Gastrointestinal: Negative for abdominal pain, constipation, diarrhea, nausea and vomiting. Endocrine: Negative for polyuria. Genitourinary: Negative for decreased urine volume, difficulty urinating, dysuria and flank pain. Musculoskeletal: Positive for arthralgias and gait problem. Negative for back pain and joint swelling. Skin: Negative for rash. Allergic/Immunologic: Negative for immunocompromised state. Neurological: Negative for weakness, numbness and headaches. Hematological: Does not bruise/bleed easily.        Vitals: 06/12/21 1725   BP: (!) 180/95   Pulse: 63   Resp: 16   Temp: 98.8 °F (37.1 °C)   SpO2: 100%   Weight: 73.2 kg (161 lb 6 oz)   Height: 5' 3\" (1.6 m)            Physical Exam  Constitutional:       General: She is not in acute distress. Appearance: Normal appearance. She is well-developed. She is not ill-appearing. HENT:      Head: Normocephalic and atraumatic. Mouth/Throat:      Mouth: Mucous membranes are moist.      Pharynx: Oropharynx is clear. Eyes:      General: No scleral icterus. Extraocular Movements: Extraocular movements intact. Conjunctiva/sclera: Conjunctivae normal.   Cardiovascular:      Rate and Rhythm: Normal rate and regular rhythm. Heart sounds: Normal heart sounds. Comments: 2+ radial and DP pulses b/l  Pulmonary:      Effort: Pulmonary effort is normal.      Breath sounds: Normal breath sounds. Abdominal:      General: There is no distension. Palpations: Abdomen is soft. Tenderness: There is no abdominal tenderness. There is no right CVA tenderness or left CVA tenderness. Musculoskeletal:      Cervical back: Normal range of motion and neck supple. No spasms or bony tenderness. Thoracic back: No spasms or bony tenderness. Lumbar back: No spasms or bony tenderness. Positive right straight leg raise test. Negative left straight leg raise test.      Comments: Pain in R piriformis region elicited with standing and active hip adduction/IR, pt reports radicular pain in back of leg to the foot  AROM while on stretcher of b/l hip, knees intact  No pain with PROM of hip in all directions b/l  Strength 5/5 plantar and dorsiflexion, great toe dorsiflexion  Sensation intact to light touch b/l LE   Skin:     General: Skin is warm and dry. Neurological:      Mental Status: She is alert.           MDM  Number of Diagnoses or Management Options     Differential includes piriformis syndrome, sciatica, DDD, DJD, spinal cord injury, muscular spasm    Pt presenting with R hip and leg pain, radicular in nature, most c/w sciatica. She has had sciatica in the past, and reports this feels similar. No back pain, no s/sx concerning for spinal cord compression or cauda equina. She reports she has a walker at home to get around, encouarged use while recovering. She is already on steroids, NSAIDs and tylenol. Treated with toradol in the ER and pt reports some improvement. She requests a refill of the NSAIDs and tylenol, will add muscle relaxer but advised risk of sedation and falls. Advised she get close f/u with PCP for PT, will also refer to back/spine and pain specialist to discuss adjunctive treatment. Discussed reasons to return to the ER.      Procedures

## 2021-06-12 NOTE — ED NOTES
Dr. Jessica Jean-Baptiste and I have reviewed discharge instructions with the patient. The patient verbalized understanding.

## 2021-06-12 NOTE — ED TRIAGE NOTES
Pt. Complains of right sided back and leg pain. She states she rode down to beach on Saturday and have been having this slight pain on right side, but sometimes on left side as well. Pt. Came back today and had worse pain driving back. Pt. Has hx. Of sciatica.

## 2021-06-12 NOTE — ED NOTES
Dr. Vanessa Cross and I have reviewed discharge instructions with the patient. The patient verbalized understanding. Pt. Taken out to vehicle by RN in wheelchair.

## 2021-06-16 ENCOUNTER — HOSPITAL ENCOUNTER (OUTPATIENT)
Dept: GENERAL RADIOLOGY | Age: 76
Discharge: HOME | End: 2021-06-16
Payer: MEDICARE

## 2021-06-16 ENCOUNTER — OFFICE VISIT (OUTPATIENT)
Dept: PRIMARY CARE CLINIC | Age: 76
End: 2021-06-16
Payer: MEDICARE

## 2021-06-16 VITALS
HEIGHT: 63 IN | RESPIRATION RATE: 18 BRPM | HEART RATE: 65 BPM | SYSTOLIC BLOOD PRESSURE: 121 MMHG | WEIGHT: 156.8 LBS | BODY MASS INDEX: 27.78 KG/M2 | TEMPERATURE: 97.1 F | DIASTOLIC BLOOD PRESSURE: 83 MMHG | OXYGEN SATURATION: 98 %

## 2021-06-16 DIAGNOSIS — M54.41 ACUTE RIGHT-SIDED LOW BACK PAIN WITH RIGHT-SIDED SCIATICA: ICD-10-CM

## 2021-06-16 DIAGNOSIS — I10 ESSENTIAL HYPERTENSION: ICD-10-CM

## 2021-06-16 DIAGNOSIS — M62.830 BACK SPASM: ICD-10-CM

## 2021-06-16 DIAGNOSIS — M54.41 ACUTE RIGHT-SIDED LOW BACK PAIN WITH RIGHT-SIDED SCIATICA: Primary | ICD-10-CM

## 2021-06-16 PROCEDURE — G8536 NO DOC ELDER MAL SCRN: HCPCS | Performed by: INTERNAL MEDICINE

## 2021-06-16 PROCEDURE — 3017F COLORECTAL CA SCREEN DOC REV: CPT | Performed by: INTERNAL MEDICINE

## 2021-06-16 PROCEDURE — G8752 SYS BP LESS 140: HCPCS | Performed by: INTERNAL MEDICINE

## 2021-06-16 PROCEDURE — G8510 SCR DEP NEG, NO PLAN REQD: HCPCS | Performed by: INTERNAL MEDICINE

## 2021-06-16 PROCEDURE — 1101F PT FALLS ASSESS-DOCD LE1/YR: CPT | Performed by: INTERNAL MEDICINE

## 2021-06-16 PROCEDURE — G8399 PT W/DXA RESULTS DOCUMENT: HCPCS | Performed by: INTERNAL MEDICINE

## 2021-06-16 PROCEDURE — 1090F PRES/ABSN URINE INCON ASSESS: CPT | Performed by: INTERNAL MEDICINE

## 2021-06-16 PROCEDURE — G8754 DIAS BP LESS 90: HCPCS | Performed by: INTERNAL MEDICINE

## 2021-06-16 PROCEDURE — G8419 CALC BMI OUT NRM PARAM NOF/U: HCPCS | Performed by: INTERNAL MEDICINE

## 2021-06-16 PROCEDURE — 99213 OFFICE O/P EST LOW 20 MIN: CPT | Performed by: INTERNAL MEDICINE

## 2021-06-16 PROCEDURE — 72100 X-RAY EXAM L-S SPINE 2/3 VWS: CPT

## 2021-06-16 PROCEDURE — G8427 DOCREV CUR MEDS BY ELIG CLIN: HCPCS | Performed by: INTERNAL MEDICINE

## 2021-06-16 RX ORDER — CYCLOBENZAPRINE HCL 10 MG
10 TABLET ORAL
Qty: 18 TABLET | Refills: 0 | Status: SHIPPED | OUTPATIENT
Start: 2021-06-16 | End: 2022-09-07 | Stop reason: ALTCHOICE

## 2021-06-16 NOTE — PROGRESS NOTES
Jose Juan Melendez (: 1945) is a 77 y.o. female, established patient, here for evaluation of the following chief complaint(s):  Back Pain     Written by Kavon Alas, as dictated by Dr. Nick Trinidad MD.      ASSESSMENT/PLAN:  Below is the assessment and plan developed based on review of pertinent history, physical exam, labs, studies, and medications. 1. Acute right-sided low back pain with right-sided sciatica  Provided her with a written referral to physical therapy. Told her to not make an appt for PT until next week. Continue on naproxen for the next couple days. Prescribed Flexeril 10mg to take prn for pain. Potential side effects were discussed. Ordered an xray of her lumbar spine for further evaluation. -     XR SPINE LUMB 2 OR 3 V; Future  -     REFERRAL TO PHYSICAL THERAPY  -     cyclobenzaprine (FLEXERIL) 10 mg tablet; Take 1 Tablet by mouth three (3) times daily as needed for Muscle Spasm(s). , Normal, Disp-18 Tablet, R-0 sent to pharmacy. 2. Essential hypertension  BP is well controlled on current regimen of HCTZ 12.5mg daily and lisinopril 40mg daily. Continue current medications. 3. Back spasm  Prescribed Flexeril 10mg to take prn for pain. Potential side effects were discussed. -     cyclobenzaprine (FLEXERIL) 10 mg tablet; Take 1 Tablet by mouth three (3) times daily as needed for Muscle Spasm(s). , Normal, Disp-18 Tablet, R-0 sent to pharmacy. SUBJECTIVE/OBJECTIVE:  HPI   The patient presents today c/o back pain. She recently went to [facility] and had a flare up of lower back with radiation down her R leg. She was rx'd a steroid dosepack which helped improve the pain, however it returned. She has finished her course of steroids and is taking naproxen for back pain. She is currently ambulating using a cane. Her BP today is good at 121/83. She is taking HCTZ 12.5mg and lisinopril 40mg daily for HTN.      Patient Active Problem List   Diagnosis Code    Essential hypertension I10        Current Outpatient Medications on File Prior to Visit   Medication Sig Dispense Refill    naproxen (Naprosyn) 500 mg tablet Take 1 Tablet by mouth two (2) times daily as needed for Pain for up to 10 days. 20 Tablet 0    lovastatin (MEVACOR) 20 mg tablet TAKE 1 TABLET BY MOUTH EVERY OTHER DAY 45 Tablet 0    escitalopram oxalate (LEXAPRO) 5 mg tablet TAKE 1 TABLET BY MOUTH DAILY 30 Tablet 0    hydroCHLOROthiazide (HYDRODIURIL) 12.5 mg tablet Take 1 Tablet by mouth daily for 90 days. 90 Tablet 0    lisinopriL (PRINIVIL, ZESTRIL) 40 mg tablet TAKE 1 TABLET BY MOUTH DAILY 90 Tab 0    albuterol (PROAIR HFA) 90 mcg/actuation inhaler Take  by inhalation.  pantoprazole (PROTONIX) 40 mg tablet Take 40 mg by mouth daily.  co-enzyme Q-10 (CO Q-10) 100 mg capsule Take 200 mg by mouth daily (after dinner).  OTHER Take 2 Caps by mouth daily (after dinner). Juice Plus-Orchard Blend- Blended Fruit and Vegetable Juice Powders-takes 2 caps daily  Juice Plus Garden Blend-Blended fruit and vegetable juice powders-takes 2 caps daily      [DISCONTINUED] cyclobenzaprine (FLEXERIL) 10 mg tablet Take 1 Tablet by mouth three (3) times daily as needed for Muscle Spasm(s). 18 Tablet 0    [DISCONTINUED] methylPREDNISolone (MEDROL DOSEPACK) 4 mg tablet As directed. 1 Dose Pack 0     No current facility-administered medications on file prior to visit. Allergies   Allergen Reactions    Clindamycin Diarrhea     \"It gave me C-Dif. \"    Statins-Hmg-Coa Reductase Inhibitors Other (comments)     \"It makes all of my joints ache. \"       Past Medical History:   Diagnosis Date    Adverse effect of anesthesia     Slow to awake from Anesthesia    Difficult intubation 12/11/15    required glidescope and bougie    Hypertension     Ill-defined condition     Tinnitus    Ill-defined condition 2011    Fractured Left ankle       Past Surgical History:   Procedure Laterality Date    HX BREAST BIOPSY Left 2005?    Benign    HX COLONOSCOPY      HX DILATION AND CURETTAGE      for uterine polyp    HX GI      hernia    HX HEENT      cataract     HX ORTHOPAEDIC Left 2011    ORIF Left ankle       Family History   Problem Relation Age of Onset    Stroke Mother     Colon Cancer Father     Breast Cancer Paternal Grandmother        Social History     Socioeconomic History    Marital status:      Spouse name: Not on file    Number of children: Not on file    Years of education: Not on file    Highest education level: Not on file   Occupational History    Not on file   Tobacco Use    Smoking status: Never Smoker    Smokeless tobacco: Never Used   Substance and Sexual Activity    Alcohol use: No    Drug use: No    Sexual activity: Not on file   Other Topics Concern    Not on file   Social History Narrative    Not on file     Social Determinants of Health     Financial Resource Strain:     Difficulty of Paying Living Expenses:    Food Insecurity:     Worried About Running Out of Food in the Last Year:     Ran Out of Food in the Last Year:    Transportation Needs:     Lack of Transportation (Medical):  Lack of Transportation (Non-Medical):    Physical Activity:     Days of Exercise per Week:     Minutes of Exercise per Session:    Stress:     Feeling of Stress :    Social Connections:     Frequency of Communication with Friends and Family:     Frequency of Social Gatherings with Friends and Family:     Attends Confucianist Services:     Active Member of Clubs or Organizations:     Attends Club or Organization Meetings:     Marital Status:    Intimate Partner Violence:     Fear of Current or Ex-Partner:     Emotionally Abused:     Physically Abused:     Sexually Abused:        No visits with results within 3 Month(s) from this visit.    Latest known visit with results is:   Office Visit on 09/28/2020   Component Date Value Ref Range Status    Hep C virus Ab Interp. 09/28/2020 NONREACTIVE NONREACTIVE   Final    Hep C  virus Ab comment 09/28/2020 Method used is Siemens Advia Centaur    Final    WBC 09/28/2020 5.7  3.6 - 11.0 K/uL Final    RBC 09/28/2020 4.42  3.80 - 5.20 M/uL Final    HGB 09/28/2020 13.2  11.5 - 16.0 g/dL Final    HCT 09/28/2020 42.0  35.0 - 47.0 % Final    MCV 09/28/2020 95.0  80.0 - 99.0 FL Final    MCH 09/28/2020 29.9  26.0 - 34.0 PG Final    MCHC 09/28/2020 31.4  30.0 - 36.5 g/dL Final    RDW 09/28/2020 12.9  11.5 - 14.5 % Final    PLATELET 71/46/8180 510  150 - 400 K/uL Final    MPV 09/28/2020 11.8  8.9 - 12.9 FL Final    NRBC 09/28/2020 0.0  0  WBC Final    ABSOLUTE NRBC 09/28/2020 0.00  0.00 - 0.01 K/uL Final    LIPID PROFILE 09/28/2020        Final    Cholesterol, total 09/28/2020 204* <200 MG/DL Final    Triglyceride 09/28/2020 88  <150 MG/DL Final    Comment: Based on NCEP-ATP III:  Triglycerides <150 mg/dL  is considered normal, 150-199  mg/dL  borderline high,  200-499 mg/dL high and  greater than or equal to 500  mg/dL very high.  HDL Cholesterol 09/28/2020 75  MG/DL Final    Comment: Based on NCEP ATP III, HDL Cholesterol <40 mg/dL is considered low and >60  mg/dL is elevated.       LDL, calculated 09/28/2020 111.4* 0 - 100 MG/DL Final    Comment: Based on the NCEP-ATP: LDL-C concentrations are considered  optimal <100 mg/dL,  near optimal/above Normal 100-129 mg/dL Borderline High: 130-159, High: 160-189  mg/dL Very High: Greater than or equal to 190 mg/dL      VLDL, calculated 09/28/2020 17.6  MG/DL Final    CHOL/HDL Ratio 09/28/2020 2.7  0.0 - 5.0   Final    Sodium 09/28/2020 141  136 - 145 mmol/L Final    Potassium 09/28/2020 4.0  3.5 - 5.1 mmol/L Final    Chloride 09/28/2020 106  97 - 108 mmol/L Final    CO2 09/28/2020 28  21 - 32 mmol/L Final    Anion gap 09/28/2020 7  5 - 15 mmol/L Final    Glucose 09/28/2020 85  65 - 100 mg/dL Final    BUN 09/28/2020 19  6 - 20 MG/DL Final    Creatinine 09/28/2020 0.73  0.55 - 1.02 MG/DL Final    BUN/Creatinine ratio 09/28/2020 26* 12 - 20   Final    GFR est AA 09/28/2020 >60  >60 ml/min/1.73m2 Final    GFR est non-AA 09/28/2020 >60  >60 ml/min/1.73m2 Final    Comment: Estimated GFR is calculated using the IDMS-traceable Modification of Diet in  Renal Disease (MDRD) Study equation, reported for both  Americans  (GFRAA) and non- Americans (GFRNA), and normalized to 1.73m2 body  surface area. The physician must decide which value applies to the patient.  Calcium 09/28/2020 9.1  8.5 - 10.1 MG/DL Final    Bilirubin, total 09/28/2020 0.7  0.2 - 1.0 MG/DL Final    ALT (SGPT) 09/28/2020 18  12 - 78 U/L Final    AST (SGOT) 09/28/2020 18  15 - 37 U/L Final    Alk. phosphatase 09/28/2020 70  45 - 117 U/L Final    Protein, total 09/28/2020 7.0  6.4 - 8.2 g/dL Final    Albumin 09/28/2020 3.6  3.5 - 5.0 g/dL Final    Globulin 09/28/2020 3.4  2.0 - 4.0 g/dL Final    A-G Ratio 09/28/2020 1.1  1.1 - 2.2   Final      Review of Systems   Constitutional: Negative for activity change, fatigue and unexpected weight change. HENT: Negative for congestion, hearing loss, rhinorrhea and sore throat. Eyes: Negative for discharge. Respiratory: Negative for cough, chest tightness and shortness of breath. Cardiovascular: Negative for leg swelling. Gastrointestinal: Negative for abdominal pain, constipation and diarrhea. Genitourinary: Negative for dysuria, flank pain, frequency and urgency. Musculoskeletal: Positive for back pain. Negative for arthralgias and myalgias. Skin: Negative for color change and rash. Neurological: Negative for dizziness, light-headedness and headaches. Psychiatric/Behavioral: Negative for dysphoric mood and sleep disturbance. The patient is not nervous/anxious.       Visit Vitals  /83 (BP 1 Location: Right arm, BP Patient Position: Sitting)   Pulse 65   Temp 97.1 °F (36.2 °C) (Temporal)   Resp 18   Ht 5' 3\" (1.6 m)   Wt 156 lb 12.8 oz (71.1 kg) SpO2 98%   BMI 27.78 kg/m²      Physical Exam  Vitals and nursing note reviewed. Constitutional:       General: She is not in acute distress. Appearance: Normal appearance. She is well-developed. She is not diaphoretic. HENT:      Right Ear: External ear normal.      Left Ear: External ear normal.   Eyes:      General: No scleral icterus. Right eye: No discharge. Left eye: No discharge. Extraocular Movements: Extraocular movements intact. Conjunctiva/sclera: Conjunctivae normal.   Cardiovascular:      Rate and Rhythm: Normal rate and regular rhythm. Pulmonary:      Effort: Pulmonary effort is normal.      Breath sounds: Normal breath sounds. No wheezing. Abdominal:      General: Bowel sounds are normal.      Palpations: Abdomen is soft. Tenderness: There is no abdominal tenderness. Musculoskeletal:      Cervical back: Normal range of motion and neck supple. Lymphadenopathy:      Cervical: No cervical adenopathy. Neurological:      Mental Status: She is alert and oriented to person, place, and time. Psychiatric:         Mood and Affect: Mood and affect normal.         An electronic signature was used to authenticate this note.   -- Nilesh Anaya

## 2021-06-17 DIAGNOSIS — M48.061 DEGENERATIVE LUMBAR SPINAL STENOSIS: Primary | ICD-10-CM

## 2021-06-18 ENCOUNTER — TELEPHONE (OUTPATIENT)
Dept: PRIMARY CARE CLINIC | Age: 76
End: 2021-06-18

## 2021-06-18 DIAGNOSIS — M48.061 DEGENERATIVE LUMBAR SPINAL STENOSIS: Primary | ICD-10-CM

## 2021-06-18 RX ORDER — NAPROXEN 500 MG/1
500 TABLET, DELAYED RELEASE ORAL 2 TIMES DAILY WITH MEALS
Qty: 30 TABLET | Refills: 0 | Status: SHIPPED | OUTPATIENT
Start: 2021-06-18 | End: 2021-07-03

## 2021-06-18 NOTE — TELEPHONE ENCOUNTER
----- Message from Tate Jeannie sent at 6/18/2021  1:38 PM EDT -----  Regarding: MD Lorelei/Telephone  General Message/Vendor Calls    Caller's first and last name: Self      Reason for call: Unable to get pain reliever. Callback required yes/no and why: Yes      Best contact number(s): [phone]      Details to clarify the request: Pharmacy did not have coated pain reliever, pt asking if there is another option. She has 5 tablets left of pain reliever given previously. Was able to  muscle relaxer.        Tate Jeannie

## 2021-06-18 NOTE — TELEPHONE ENCOUNTER
Returned call to patient.  Explained to her that I will get a message to Dr Jeff Joyner regarding pain medication

## 2021-06-24 ENCOUNTER — TELEPHONE (OUTPATIENT)
Dept: PRIMARY CARE CLINIC | Age: 76
End: 2021-06-24

## 2021-06-24 DIAGNOSIS — M51.36 LUMBAR DEGENERATIVE DISC DISEASE: Primary | ICD-10-CM

## 2021-06-24 RX ORDER — METHYLPREDNISOLONE 4 MG/1
TABLET ORAL
Qty: 1 DOSE PACK | Refills: 0 | Status: SHIPPED | OUTPATIENT
Start: 2021-06-24 | End: 2021-07-17

## 2021-07-01 DIAGNOSIS — M54.50 CHRONIC MIDLINE LOW BACK PAIN WITHOUT SCIATICA: Primary | ICD-10-CM

## 2021-07-01 DIAGNOSIS — G89.29 CHRONIC MIDLINE LOW BACK PAIN WITHOUT SCIATICA: Primary | ICD-10-CM

## 2021-07-01 RX ORDER — CYCLOBENZAPRINE HCL 10 MG
10 TABLET ORAL 2 TIMES DAILY
Qty: 30 TABLET | Refills: 0 | Status: SHIPPED | OUTPATIENT
Start: 2021-07-01 | End: 2021-07-16

## 2021-07-16 ENCOUNTER — TRANSCRIBE ORDER (OUTPATIENT)
Dept: SCHEDULING | Age: 76
End: 2021-07-16

## 2021-07-16 DIAGNOSIS — M43.16 SPONDYLOLISTHESIS OF LUMBAR REGION: ICD-10-CM

## 2021-07-16 DIAGNOSIS — M54.31 RIGHT SIDED SCIATICA: Primary | ICD-10-CM

## 2021-07-17 ENCOUNTER — HOSPITAL ENCOUNTER (EMERGENCY)
Age: 76
Discharge: HOME | End: 2021-07-17
Attending: EMERGENCY MEDICINE
Payer: MEDICARE

## 2021-07-17 VITALS
SYSTOLIC BLOOD PRESSURE: 161 MMHG | TEMPERATURE: 97.4 F | DIASTOLIC BLOOD PRESSURE: 99 MMHG | OXYGEN SATURATION: 98 % | RESPIRATION RATE: 16 BRPM | HEART RATE: 67 BPM | BODY MASS INDEX: 28.28 KG/M2 | HEIGHT: 63 IN | WEIGHT: 159.61 LBS

## 2021-07-17 DIAGNOSIS — M54.40 CHRONIC LOW BACK PAIN WITH SCIATICA, SCIATICA LATERALITY UNSPECIFIED, UNSPECIFIED BACK PAIN LATERALITY: Primary | ICD-10-CM

## 2021-07-17 DIAGNOSIS — G89.29 CHRONIC LOW BACK PAIN WITH SCIATICA, SCIATICA LATERALITY UNSPECIFIED, UNSPECIFIED BACK PAIN LATERALITY: Primary | ICD-10-CM

## 2021-07-17 PROCEDURE — 99283 EMERGENCY DEPT VISIT LOW MDM: CPT

## 2021-07-17 NOTE — ED NOTES
Extensive teaching about medication administration and importance of taking medication that is Rx and adjusting timing of medication for maximum benefit. Discussed proper mechanics of getting up as well positioning for sleep Pt. And niece verbalized understanding.

## 2021-07-17 NOTE — ED PROVIDER NOTES
Date: 7/17/2021  Patient Name: Sarah Joshi    History of Presenting Illness     Chief Complaint   Patient presents with    Back Pain       History Provided By: Patient    HPI: Sarah Joshi, 77 y.o. female with a past medical history of hypertension and asthma presents to the ED with cc of chronic low back pain with right-sided sciatica. Patient states she has had multiple ER visits and has been to [address] [place] [address] for the same pain. She is currently being followed by Dr. Rashaun Benedict from orthopedics and has an MRI scheduled with a subsequent follow-up. She states that she is currently on Flexeril, gabapentin and naproxen for her pain and she also received tramadol from [address] [place] [address]. She states she becomes very anxious because she has significant amount of pain in the morning when she gets up and has difficulty getting to the bathroom. She lives by herself and has begun using her 's walker. She states the pain is consistent with her prior episodes denies any worsening of the pain but she is just frustrated that the pain continues to persist.  She denies any changes to her bladder or bowel habits. There are no other complaints, changes, or physical findings at this time. PCP: Prince Stephens MD    No current facility-administered medications on file prior to encounter. Current Outpatient Medications on File Prior to Encounter   Medication Sig Dispense Refill    escitalopram oxalate (LEXAPRO) 5 mg tablet TAKE 1 TABLET BY MOUTH DAILY 90 Tablet 0    cyclobenzaprine (FLEXERIL) 10 mg tablet Take 1 Tablet by mouth three (3) times daily as needed for Muscle Spasm(s). 18 Tablet 0    lovastatin (MEVACOR) 20 mg tablet TAKE 1 TABLET BY MOUTH EVERY OTHER DAY 45 Tablet 0    hydroCHLOROthiazide (HYDRODIURIL) 12.5 mg tablet Take 1 Tablet by mouth daily for 90 days.  90 Tablet 0    lisinopriL (PRINIVIL, ZESTRIL) 40 mg tablet TAKE 1 TABLET BY MOUTH DAILY 90 Tab 0    albuterol (PROAIR HFA) 90 mcg/actuation inhaler Take  by inhalation.  pantoprazole (PROTONIX) 40 mg tablet Take 40 mg by mouth daily.  co-enzyme Q-10 (CO Q-10) 100 mg capsule Take 200 mg by mouth daily (after dinner).  [] cyclobenzaprine (FLEXERIL) 10 mg tablet Take 1 Tablet by mouth two (2) times a day for 15 days. 30 Tablet 0    [DISCONTINUED] methylPREDNISolone (MEDROL DOSEPACK) 4 mg tablet As directed 1 Dose Pack 0    [DISCONTINUED] OTHER Take 2 Caps by mouth daily (after dinner). Juice Plus-Orchard Blend- Blended Fruit and Vegetable Juice Powders-takes 2 caps daily  Juice Plus Garden Blend-Blended fruit and vegetable juice powders-takes 2 caps daily         Past History     Past Medical History:  Past Medical History:   Diagnosis Date    Adverse effect of anesthesia     Slow to awake from Anesthesia    Asthma     Difficult intubation 12/11/15    required glidescope and bougie    Hypertension     Ill-defined condition     Tinnitus    Ill-defined condition     Fractured Left ankle       Past Surgical History:  Past Surgical History:   Procedure Laterality Date    HX BREAST BIOPSY Left ? Benign    HX COLONOSCOPY      HX DILATION AND CURETTAGE      for uterine polyp    HX GI      hernia    HX HEENT      cataract     HX ORTHOPAEDIC Left 2011    ORIF Left ankle       Family History:  Family History   Problem Relation Age of Onset    Stroke Mother     Colon Cancer Father     Breast Cancer Paternal Grandmother        Social History:  Social History     Tobacco Use    Smoking status: Never Smoker    Smokeless tobacco: Never Used   Substance Use Topics    Alcohol use: No    Drug use: No       Allergies: Allergies   Allergen Reactions    Clindamycin Diarrhea     \"It gave me C-Dif. \"    Statins-Hmg-Coa Reductase Inhibitors Other (comments)     \"It makes all of my joints ache. \"         Review of Systems   Review of Systems   All other systems reviewed and are negative.       Physical Exam   Physical Exam  Vitals and nursing note reviewed. Constitutional:       Appearance: Normal appearance. HENT:      Head: Normocephalic and atraumatic. Eyes:      Extraocular Movements: Extraocular movements intact. Conjunctiva/sclera: Conjunctivae normal.   Cardiovascular:      Pulses: Normal pulses. Pulmonary:      Effort: Pulmonary effort is normal. No respiratory distress. Musculoskeletal:         General: No swelling, tenderness or deformity. Normal range of motion. Cervical back: Normal range of motion and neck supple. Comments: Normal range of motion of her back and her bilateral lower extremities. Skin:     General: Skin is warm and dry. Neurological:      General: No focal deficit present. Mental Status: She is alert and oriented to person, place, and time. Psychiatric:         Mood and Affect: Mood normal.         Behavior: Behavior normal.         Thought Content: Thought content normal.         Judgment: Judgment normal.         Diagnostic Study Results     Labs -  No results found for this or any previous visit (from the past 72 hour(s)). Radiologic Studies -   No orders to display     CT Results  (Last 48 hours)    None        CXR Results  (Last 48 hours)    None          Medical Decision Making   I am the first provider for this patient. I reviewed the vital signs, available nursing notes, past medical history, past surgical history, family history and social history. Vital Signs-Reviewed the patient's vital signs. Patient Vitals for the past 12 hrs:   Temp Pulse Resp BP SpO2   07/17/21 1139 97.4 °F (36.3 °C) 67 16 (!) 200/92 98 %         Records Reviewed: Nursing Notes and Old Medical Records    Provider Notes (Medical Decision Making):   Patient with a history of chronic back pain, has been seen in the ER multiple times and is currently being followed by Dr. Moshe Mayberry.   She has a follow-up appointment for an MRI as well as an appointment with him in the upcoming weeks.  She states the pain is the same as it had been before and there is been no acute changes. No signs and symptoms of cauda equina and patient is already being treated appropriately with multiple medications. Counseled patient on the timing of her medications. ED Course:   Initial assessment performed. The patients presenting problems have been discussed, and they are in agreement with the care plan formulated and outlined with them. I have encouraged them to ask questions as they arise throughout their visit. Disposition:      The patient's results have been reviewed with Her. She has been counseled regarding her diagnosis. She verbally conveys understanding and agreement of the signs, symptoms, diagnosis, treatment, and prognosis and additionally agrees to follow up as recommended with Dr. Emmy Seo MD in 24-48 hours. She also agrees with the care-plan and conveys that all of Her questions have been answered. I have also put together some discharge instructions for Her that include: 1) educational information regarding their diagnosis, 2) how to care for their diagnosis at home, as well a 3) list of reasons why they would want to return to the ED prior to their follow-up appointment, should their condition change. DISCHARGE PLAN:  1. Current Discharge Medication List        2. Follow-up Information     Follow up With Specialties Details Why Contact Info    Emmy Seo MD Internal Medicine Schedule an appointment as soon as possible for a visit   [address] [place] [address]  [phone]      [facility] Emergency Medicine  As needed, If symptoms worsen [address]    Kendell Garcia MD Pain Management Go to   [address] [place] [address] 100  [facility] 7 [phone]          3. Return to ED if worse     Diagnosis     Clinical Impression:   1.  Chronic low back pain with sciatica, sciatica laterality unspecified, unspecified back pain laterality        Attestations:    Adine Leyden, DO    Please note that this dictation was completed with [employer], the computer voice recognition software. Quite often unanticipated grammatical, syntax, homophones, and other interpretive errors are inadvertently transcribed by the computer software. Please disregard these errors. Please excuse any errors that have escaped final proofreading. Thank you.

## 2021-07-17 NOTE — ED TRIAGE NOTES
Pt. Has chronic back pain and is \"under the care of doctor\". Pt. Is currently in PT as well and has a rx for muscle relaxer and gabapentin and naproxen. Today she tearfully state every morning she has a hard time getting up to go to the bathroom. Pt. Denies any loss of bowel or bladder. Once she gets up and goes to the bathroom she'll take her medication. Pt. Has a schedule she writes it all down.    Pt. Is scheduled for a MRI and follow up Dr. Osvaldo Hurtado

## 2021-07-27 ENCOUNTER — HOSPITAL ENCOUNTER (OUTPATIENT)
Dept: MRI IMAGING | Age: 76
Discharge: HOME | End: 2021-07-27
Attending: PAIN MEDICINE
Payer: MEDICARE

## 2021-07-27 DIAGNOSIS — M54.31 RIGHT SIDED SCIATICA: ICD-10-CM

## 2021-07-27 DIAGNOSIS — M43.16 SPONDYLOLISTHESIS OF LUMBAR REGION: ICD-10-CM

## 2021-07-27 PROCEDURE — 72148 MRI LUMBAR SPINE W/O DYE: CPT

## 2021-07-28 ENCOUNTER — TRANSCRIBE ORDER (OUTPATIENT)
Dept: SCHEDULING | Age: 76
End: 2021-07-28

## 2021-08-07 DIAGNOSIS — I10 ESSENTIAL HYPERTENSION: ICD-10-CM

## 2021-08-07 RX ORDER — HYDROCHLOROTHIAZIDE 12.5 MG/1
TABLET ORAL
Qty: 90 TABLET | Refills: 0 | Status: SHIPPED | OUTPATIENT
Start: 2021-08-07 | End: 2021-11-13

## 2021-08-07 RX ORDER — LISINOPRIL 40 MG/1
TABLET ORAL
Qty: 90 TABLET | Refills: 0 | Status: SHIPPED | OUTPATIENT
Start: 2021-08-07 | End: 2021-11-13

## 2021-08-31 DIAGNOSIS — E78.49 OTHER HYPERLIPIDEMIA: ICD-10-CM

## 2021-08-31 RX ORDER — LOVASTATIN 20 MG/1
TABLET ORAL
Qty: 45 TABLET | Refills: 0 | Status: SHIPPED | OUTPATIENT
Start: 2021-08-31 | End: 2021-11-15

## 2021-09-03 ENCOUNTER — OFFICE VISIT (OUTPATIENT)
Dept: PRIMARY CARE CLINIC | Age: 76
End: 2021-09-03
Payer: MEDICARE

## 2021-09-03 VITALS
HEART RATE: 60 BPM | TEMPERATURE: 97.3 F | HEIGHT: 63 IN | RESPIRATION RATE: 15 BRPM | OXYGEN SATURATION: 95 % | BODY MASS INDEX: 28.92 KG/M2 | WEIGHT: 163.2 LBS | DIASTOLIC BLOOD PRESSURE: 83 MMHG | SYSTOLIC BLOOD PRESSURE: 133 MMHG

## 2021-09-03 DIAGNOSIS — I10 ESSENTIAL HYPERTENSION: ICD-10-CM

## 2021-09-03 DIAGNOSIS — Z71.89 ACP (ADVANCE CARE PLANNING): ICD-10-CM

## 2021-09-03 DIAGNOSIS — F41.9 ANXIETY: ICD-10-CM

## 2021-09-03 DIAGNOSIS — Z12.11 COLON CANCER SCREENING: ICD-10-CM

## 2021-09-03 DIAGNOSIS — M48.061 DEGENERATIVE LUMBAR SPINAL STENOSIS: ICD-10-CM

## 2021-09-03 DIAGNOSIS — Z00.00 MEDICARE ANNUAL WELLNESS VISIT, SUBSEQUENT: Primary | ICD-10-CM

## 2021-09-03 PROCEDURE — G8419 CALC BMI OUT NRM PARAM NOF/U: HCPCS | Performed by: INTERNAL MEDICINE

## 2021-09-03 PROCEDURE — G8399 PT W/DXA RESULTS DOCUMENT: HCPCS | Performed by: INTERNAL MEDICINE

## 2021-09-03 PROCEDURE — G8536 NO DOC ELDER MAL SCRN: HCPCS | Performed by: INTERNAL MEDICINE

## 2021-09-03 PROCEDURE — G8752 SYS BP LESS 140: HCPCS | Performed by: INTERNAL MEDICINE

## 2021-09-03 PROCEDURE — 3017F COLORECTAL CA SCREEN DOC REV: CPT | Performed by: INTERNAL MEDICINE

## 2021-09-03 PROCEDURE — 1101F PT FALLS ASSESS-DOCD LE1/YR: CPT | Performed by: INTERNAL MEDICINE

## 2021-09-03 PROCEDURE — 1090F PRES/ABSN URINE INCON ASSESS: CPT | Performed by: INTERNAL MEDICINE

## 2021-09-03 PROCEDURE — G0439 PPPS, SUBSEQ VISIT: HCPCS | Performed by: INTERNAL MEDICINE

## 2021-09-03 PROCEDURE — G8754 DIAS BP LESS 90: HCPCS | Performed by: INTERNAL MEDICINE

## 2021-09-03 PROCEDURE — 99213 OFFICE O/P EST LOW 20 MIN: CPT | Performed by: INTERNAL MEDICINE

## 2021-09-03 PROCEDURE — G8427 DOCREV CUR MEDS BY ELIG CLIN: HCPCS | Performed by: INTERNAL MEDICINE

## 2021-09-03 PROCEDURE — G8510 SCR DEP NEG, NO PLAN REQD: HCPCS | Performed by: INTERNAL MEDICINE

## 2021-09-03 RX ORDER — NAPROXEN 500 MG/1
500 TABLET ORAL 2 TIMES DAILY WITH MEALS
COMMUNITY

## 2021-09-03 RX ORDER — DUREZOL 0.5 MG/ML
EMULSION OPHTHALMIC
Status: ON HOLD | COMMUNITY
Start: 2021-08-25 | End: 2022-03-24

## 2021-09-03 RX ORDER — ESCITALOPRAM OXALATE 10 MG/1
10 TABLET ORAL DAILY
Qty: 90 TABLET | Refills: 0 | Status: SHIPPED | OUTPATIENT
Start: 2021-09-03 | End: 2021-10-02

## 2021-09-03 RX ORDER — GABAPENTIN 300 MG/1
300 CAPSULE ORAL 2 TIMES DAILY
COMMUNITY

## 2021-09-03 RX ORDER — BACLOFEN 10 MG/1
5 TABLET ORAL DAILY
COMMUNITY

## 2021-09-03 NOTE — PROGRESS NOTES
Chief Complaint   Patient presents with   Esther Renee Annual Wellness Visit     also wanting to talk about aniety/depression       Visit Vitals  /83 (BP 1 Location: Left arm)   Pulse 60   Temp 97.3 °F (36.3 °C)   Resp 15   Ht 5' 3\" (1.6 m)   Wt 163 lb 3.2 oz (74 kg)   SpO2 95%   BMI 28.91 kg/m²       1. Have you been to the ER, urgent care clinic since your last visit? Hospitalized since your last visit? Yes [facility] for back Pain    2. Have you seen or consulted any other health care providers outside of the [address] [place] [address] since your last visit? Include any pap smears or colon screening. Rosa Elena David is a 77 y.o. female and presents for Annual Medicare Wellness Visit. Assessment of cognitive impairment: Alert and oriented x 4. Depression Screen:   3 most recent PHQ Screens 9/3/2021   Little interest or pleasure in doing things Several days   Feeling down, depressed, irritable, or hopeless Several days   Total Score PHQ 2 2   Trouble falling or staying asleep, or sleeping too much -   Feeling tired or having little energy -   Poor appetite, weight loss, or overeating -   Feeling bad about yourself - or that you are a failure or have let yourself or your family down -   Trouble concentrating on things such as school, work, reading, or watching TV -   Moving or speaking so slowly that other people could have noticed; or the opposite being so fidgety that others notice -   Thoughts of being better off dead, or hurting yourself in some way -   PHQ 9 Score -       Fall Risk Assessment:    Fall Risk Assessment, last 12 mths 9/3/2021   Able to walk? Yes   Fall in past 12 months? 0   Do you feel unsteady? 0   Are you worried about falling 0   Is TUG test greater than 12 seconds? -   Is the gait abnormal? -   Number of falls in past 12 months -       Abuse Screen:   Abuse Screening Questionnaire 2/21/2020   Do you ever feel afraid of your partner?  N   Are you in a relationship with someone who physically or mentally threatens you? N   Is it safe for you to go home? Y       Activities of Daily Living:  Self-care. Requires assistance with: no ADLs  Patient handle his/her own medications  yes Use of pill box  yes  Activities of Daily Living:   ADL Assessment 9/3/2021   Feeding yourself No Help Needed   Getting from bed to chair No Help Needed   Getting dressed No Help Needed   Bathing or showering No Help Needed   Walk across the room (includes cane/walker) No Help Needed   Using the telphone No Help Needed   Taking your medications No Help Needed   Preparing meals No Help Needed   Managing money (expenses/bills) No Help Needed   Moderately strenuous housework (laundry) No Help Needed   Shopping for personal items (toiletries/medicines) No Help Needed   Shopping for groceries No Help Needed   Driving No Help Needed   Climbing a flight of stairs No Help Needed   Getting to places beyond walking distances No Help Needed       Health Maintenance:  Daily Aspirin: no   Bone Density: Done through [facility] not currently on file  Glaucoma Screening: yes  Immunizations:    Tetanus: up to date. Influenza: up to date. Shingles: not up to date - will do after COVID booster   PPSV-23: up to date. Prevnar-13: not up to date -     Cancer screening:    Cervical: N/a due to age. Breast: up to date. Colon: done through [address] in 2019. Alcohol Risk Screen:   On any occasion during the past 3 months, have you had more than 3 drinks(female) or 4 drinks (male) containing alcohol in one? No  Do you average more than 7 drinks (female) or 14 drinks (male) per week? No  Type and amount:No    Hearing Loss:  Hearing is good. denies any hearing loss wears hearing aides    Vision Loss:   Wears glasses, contact lenses, or have any other visual impairment  yes    Adult Nutrition Screen:  No risk factors noted. Advance Care Planning:   End of Life Planning: has no advanced directive - a copy HAS NOT been provided. ,   - add't info provided,  Gaudencio Billings [address] ACP-Facilitator appointment yes patient is wanting a  to reach out to make sure she has the right forms      Medications/Allergies: Reviewed with patient  Prior to Admission medications    Medication Sig Start Date End Date Taking? Authorizing Provider   baclofen (LIORESAL) 10 mg tablet Take 10 mg by mouth daily. Yes Provider, Historical   gabapentin (NEURONTIN) 300 mg capsule Take 300 mg by mouth two (2) times a day. Yes Provider, Historical   naproxen (NAPROSYN) 500 mg tablet Take 500 mg by mouth two (2) times daily (with meals). Yes Provider, Historical   DurezoL 0.05 % ophthalmic emulsion  8/25/21  Yes Provider, Historical   lovastatin (MEVACOR) 20 mg tablet TAKE 1 TABLET BY MOUTH EVERY OTHER DAY 8/31/21  Yes Dotty Mejia MD   hydroCHLOROthiazide (HYDRODIURIL) 12.5 mg tablet TAKE 1 TABLET BY MOUTH DAILY 8/7/21  Yes Dotty Mejia MD   lisinopriL (PRINIVIL, ZESTRIL) 40 mg tablet TAKE 1 TABLET BY MOUTH DAILY 8/7/21  Yes Dotty Mejia MD   escitalopram oxalate (LEXAPRO) 5 mg tablet TAKE 1 TABLET BY MOUTH DAILY 7/8/21  Yes Dotty Mejia MD   co-enzyme Q-10 (CO Q-10) 100 mg capsule Take 200 mg by mouth daily (after dinner). Yes Provider, Historical   cyclobenzaprine (FLEXERIL) 10 mg tablet Take 1 Tablet by mouth three (3) times daily as needed for Muscle Spasm(s). Patient not taking: Reported on 9/3/2021 6/16/21   Dotty Mejia MD   albuterol [facility] HFA) 90 mcg/actuation inhaler Take  by inhalation. Patient not taking: Reported on 9/3/2021    Provider, Historical   pantoprazole (PROTONIX) 40 mg tablet Take 40 mg by mouth daily. Other, MD Grzegorz       Allergies   Allergen Reactions    Clindamycin Diarrhea     \"It gave me C-Dif. \"    Statins-Hmg-Coa Reductase Inhibitors Other (comments)     \"It makes all of my joints ache. \"       Objective:  Visit Vitals  /83 (BP 1 Location: Left arm)   Pulse 60   Temp 97.3 °F (36.3 °C)   Resp 15   Ht 5' 3\" (1.6 m)   Wt 163 lb 3.2 oz (74 kg)   SpO2 95%   BMI 28.91 kg/m²    Body mass index is 28.91 kg/m². Problem List: Reviewed with patient and discussed risk factors. Patient Active Problem List   Diagnosis Code    Essential hypertension I10       PSH: Reviewed with patient  Past Surgical History:   Procedure Laterality Date    HX BREAST BIOPSY Left 2005? Benign    HX COLONOSCOPY      HX DILATION AND CURETTAGE      for uterine polyp    HX GI      hernia    HX HEENT      cataract     HX ORTHOPAEDIC Left 2011    ORIF Left ankle        SH: Reviewed with patient  Social History     Tobacco Use    Smoking status: Never Smoker    Smokeless tobacco: Never Used   Vaping Use    Vaping Use: Never used   Substance Use Topics    Alcohol use: No    Drug use: No       FH: Reviewed with patient  Family History   Problem Relation Age of Onset    Stroke Mother     Colon Cancer Father     Breast Cancer Paternal Grandmother        Current medical providers:    Patient Care Team:  Pavithra Maria MD as PCP - General (Internal Medicine)  Pavithra Maria MD as PCP - [facility] Provider  Eliud Pleitez MD (Cardiology)    Plan:      Diagnoses and all orders for this visit:    Medicare annual wellness visit, subsequent  . Age appropriate Health screening and immunization discussed with patient.      Colon cancer screening  -     OCCULT BLOOD IMMUNOASSAY,DIAGNOSTIC; Future    ACP (advance care planning)  -     REFERRAL TO ACP CLINICAL SPECIALIST        Health Maintenance   Topic Date Due    Colorectal Cancer Screening Combo  Never done    Shingrix Vaccine Age 49> (1 of 2) Never done    Medicare Yearly Exam  05/02/2021    DTaP/Tdap/Td series (2 - Td or Tdap) 08/01/2021    Flu Vaccine (1) 09/01/2021    Lipid Screen  09/28/2021    Hepatitis C Screening  Completed    Bone Densitometry (Dexa) Screening  Completed    COVID-19 Vaccine  Completed    Pneumococcal 65+ years  Completed          Urinary/ Fecal Incontinence: No    Regular physical exercise: Yes, doing PT twice a week, trying to walk little on her own. Patient verbalized understanding of information presented. AVS and Medicare Part B Preventive Services Table printed and given to pt and reviewed. See table for findings under Recommendation and Scheduled. All of the patient's questions were answered. Joanna Petty (: 1945) is a 77 y.o. female, established patient, here for evaluation of the following chief complaint(s): Annual Wellness Visit (also wanting to talk about aniety/depression)       ASSESSMENT/PLAN:  Below is the assessment and plan developed based on review of pertinent history, physical exam, labs, studies, and medications. 1. Essential hypertension  Well controlled on current dose of Lisinopril. 2. Degenerative lumbar spinal stenosis  Followed by spine specialist Dr. Melodie Johnson   3. Anxiety   Dose increased to 10 mg from 5 mg.  -     escitalopram oxalate (LEXAPRO) 10 mg tablet; Take 1 Tablet by mouth daily for 90 days. , Normal, Disp-90 Tablet, R-0      SUBJECTIVE/OBJECTIVE:  Patient seen today for follow-up after her spine specialist visit . She has received 2 back injections and has started physical therapy as well. She has been slowly improving, pain has been under control. Her blood pressure readings have been running in normal range. She is still dealing with eye surgery complications and has been seeing ophthalmologist regularly. She is taking Lexapro 5 mg and is wondering if we can go up on the dose. She has noticed an improvement in anxiety symptoms. Current Outpatient Medications   Medication Sig Dispense Refill    baclofen (LIORESAL) 10 mg tablet Take 10 mg by mouth daily.  gabapentin (NEURONTIN) 300 mg capsule Take 300 mg by mouth two (2) times a day.  naproxen (NAPROSYN) 500 mg tablet Take 500 mg by mouth two (2) times daily (with meals).       DurezoL 0.05 % ophthalmic emulsion       escitalopram oxalate (LEXAPRO) 10 mg tablet Take 1 Tablet by mouth daily for 90 days. 90 Tablet 0    lovastatin (MEVACOR) 20 mg tablet TAKE 1 TABLET BY MOUTH EVERY OTHER DAY 45 Tablet 0    hydroCHLOROthiazide (HYDRODIURIL) 12.5 mg tablet TAKE 1 TABLET BY MOUTH DAILY 90 Tablet 0    lisinopriL (PRINIVIL, ZESTRIL) 40 mg tablet TAKE 1 TABLET BY MOUTH DAILY 90 Tablet 0    co-enzyme Q-10 (CO Q-10) 100 mg capsule Take 200 mg by mouth daily (after dinner).  cyclobenzaprine (FLEXERIL) 10 mg tablet Take 1 Tablet by mouth three (3) times daily as needed for Muscle Spasm(s). (Patient not taking: Reported on 9/3/2021) 18 Tablet 0    albuterol (PROAIR HFA) 90 mcg/actuation inhaler Take  by inhalation. (Patient not taking: Reported on 9/3/2021)      pantoprazole (PROTONIX) 40 mg tablet Take 40 mg by mouth daily. Past Medical History:   Diagnosis Date    Adverse effect of anesthesia     Slow to awake from Anesthesia    Asthma     Difficult intubation 12/11/15    required glidescope and bougie    Hypertension     Ill-defined condition     Tinnitus    Ill-defined condition 2011    Fractured Left ankle    Spinal stenosis      Past Surgical History:   Procedure Laterality Date    HX BREAST BIOPSY Left 2005? Benign    HX COLONOSCOPY      HX DILATION AND CURETTAGE      for uterine polyp    HX GI      hernia    HX HEENT      cataract     HX ORTHOPAEDIC Left 2011    ORIF Left ankle     Lab Results   Component Value Date/Time    WBC 5.7 09/28/2020 12:05 PM    HGB 13.2 09/28/2020 12:05 PM    HCT 42.0 09/28/2020 12:05 PM    PLATELET 855 02/31/2209 12:05 PM    MCV 95.0 09/28/2020 12:05 PM     Lab Results   Component Value Date/Time    ALT (SGPT) 18 09/28/2020 12:05 PM    Alk.  phosphatase 70 09/28/2020 12:05 PM    Bilirubin, total 0.7 09/28/2020 12:05 PM    Albumin 3.6 09/28/2020 12:05 PM    Protein, total 7.0 09/28/2020 12:05 PM    PLATELET 556 44/79/8551 12:05 PM     Lab Results   Component Value Date/Time GFR est non-AA >60 09/28/2020 12:05 PM    GFR est AA >60 09/28/2020 12:05 PM    Creatinine 0.73 09/28/2020 12:05 PM    BUN 19 09/28/2020 12:05 PM    Sodium 141 09/28/2020 12:05 PM    Potassium 4.0 09/28/2020 12:05 PM    Chloride 106 09/28/2020 12:05 PM    CO2 28 09/28/2020 12:05 PM         Review of Systems   Constitutional: Negative for appetite change and fatigue. HENT: Negative for congestion and sinus pain. Respiratory: Negative for cough, chest tightness and shortness of breath. Gastrointestinal: Negative for abdominal distention, abdominal pain, blood in stool and constipation. Genitourinary: Negative for flank pain and frequency. Musculoskeletal: Positive for back pain and gait problem. Neurological: Negative for dizziness and speech difficulty. Psychiatric/Behavioral: Negative for behavioral problems and sleep disturbance. The patient is nervous/anxious. Physical Exam  Vitals and nursing note reviewed. Constitutional:       General: She is not in acute distress. Appearance: Normal appearance. HENT:      Head: Normocephalic and atraumatic. Eyes:      General:         Right eye: No discharge. Left eye: No discharge. Cardiovascular:      Rate and Rhythm: Normal rate and regular rhythm. Pulses: Normal pulses. Heart sounds: Normal heart sounds. Pulmonary:      Effort: Pulmonary effort is normal. No respiratory distress. Breath sounds: Normal breath sounds. Abdominal:      General: Bowel sounds are normal. There is no distension. Palpations: Abdomen is soft. Musculoskeletal:      Cervical back: Normal range of motion and neck supple. Right lower leg: No edema. Left lower leg: No edema. Neurological:      General: No focal deficit present. Mental Status: She is alert and oriented to person, place, and time. Cranial Nerves: No cranial nerve deficit.    Psychiatric:         Mood and Affect: Mood normal.         Behavior: Behavior normal.       An electronic signature was used to authenticate this note.   -- [place] MD

## 2021-09-07 ENCOUNTER — PATIENT OUTREACH (OUTPATIENT)
Dept: CASE MANAGEMENT | Age: 76
End: 2021-09-07

## 2021-09-07 NOTE — ACP (ADVANCE CARE PLANNING)
Advance Care Planning   Ambulatory ACP Specialist Patient Outreach    Date:  9/7/2021    ACP Specialist:  Alba Trinidad LPN    Outreach call to patient in follow-up to ACP Specialist referral from:    [x] PCP  [] Provider   [] Ambulatory Care Management [] Other     For:                  [x] Advance Directive Assistance              [] Complete Portable DNR order              [] Complete POST/MOST              [] Code Status Discussion             [] Discuss Goals of Care             [] Early ACP Decision-Making              [] Other (Specify)    Date Referral Received: 9/3/21    Today's Outreach:  [x] First   [] Second  [] Third       Third outreach made by: [] Phone  [] Email / mail    [] MyChart     Intervention:  [x] Spoke with Patient   [] Left VM requesting return call      Outcome: Spoke with pt who wishes to move forward in completing an AMD. Appt with ACP specialist is scheduled for 9/9/21 at [address] [place] [address]. ACP documents have been e-mailed to pt for review prior to appt       Next Step:   [x] ACP scheduled conversation  [] Outreach again in one week               [x] Email / Mail [address]  [] Email / Mail Advance Directive   [] Closing referral.  Routing closure to referring provider/staff and to ACP Specialist . [] Closure letter mailed to patient with invitation to contact ACP Specialist if / when ready.   Thank you for this referral.

## 2021-09-09 ENCOUNTER — DOCUMENTATION ONLY (OUTPATIENT)
Dept: CASE MANAGEMENT | Age: 76
End: 2021-09-09

## 2021-09-09 NOTE — ACP (ADVANCE CARE PLANNING)
Advance Care Planning     Advance Care Planning Clinical Specialist  Conversation Note      Date of ACP Conversation: 09/09/21    Conversation Conducted with:  Patient with Decision Making Capacity    ACP Clinical Specialist: Kiesha Adams LCSW        Health Care Decision Maker:    Current Designated Health Care Decision Maker:     Primary Decision Maker: Charmayne Patrick - Son-in-Law - [phone]    Secondary Decision Maker: Magdalena Keene Daughter - [phone]    Supplemental (Other) Decision Maker: Tucker Olmos Child - [phone]      Care Preferences    Hospitalization: \"If your health worsens and it becomes clear that your chance of recovery is unlikely, what would your preference be regarding hospitalization? \"    Choice:  []  The patient wants hospitalization  [x]  The patient prefers comfort-focused treatment without hospitalization. Ventilation: \"If you were in your present state of health and suddenly became very ill and were unable to breathe on your own, what would your preference be about the use of a ventilator (breathing machine) if it were available to you? \"      If patient would desire the use of a ventilator (breathing machine), answer \"yes\", if not \"no\": NO    \"If your health worsens and it becomes clear that your chance of recovery is unlikely, what would your preference be about the use of a ventilator (breathing machine) if it were available to you? \"     Would the patient desire the use of a ventilator (breathing machine)? NO      Resuscitation  \"CPR works best to restart the heart when there is a sudden event, like a heart attack, in someone who is otherwise healthy. Unfortunately, CPR does not typically restart the heart for people who have serious health conditions or who are very sick. \"    \"In the event your heart stopped as a result of an underlying serious health condition, would you want attempts to be made to restart your heart (answer \"yes\" for attempt to resuscitate) or would you prefer a natural death (answer \"no\" for do not attempt to resuscitate)? \" NO. Pt indicates she has a signed DDRN. She will make sure its scanned into chart. She stated she has it in her \"lockbox. \" Specialist requested she have it in her home to have it readily available.      [] Yes  [] No   Educated Patient / Othelia Else regarding differences between Advance Directives and portable DNR orders. Length of ACP Conversation in minutes:  45 minutes. Pt discussed having to administer CPR on her  at time of his death as well as revisiting her mother's CVA resulting in having a feeding tube until her death (3 years). She does not want to in this medical situation. Conversation Outcomes:  [x] ACP discussion completed  [] Existing advance directive reviewed with patient; no changes to patient's previously recorded wishes   [x] New Advance Directive completed: To be mailed to pt for review. If she wishes, it will be sent via [employer] for signatures. [] Portable Do Not Resuscitate prepared for Provider review and signature  [] POLST/POST/MOLST/MOST prepared for Provider review and signature      Follow-up plan:    [x] Schedule follow-up conversation to continue planning: Specialist will call in 1-2 weeks if pt has called to complete. 9/21/2021: spoke with pt today. She had sent her current AMD which has been scanned into her chart; however, she is leaning on signing the updated one completed with Specialist. She will call once decision is made to sign via [employer].   [] Referred individual to Provider for additional questions/concerns   [] Advised patient/agent/surrogate to review completed ACP document and update if needed with changes in condition, patient preferences or care setting     [x] This note routed to one or more involved healthcare providers    Casandra Macario LCSW

## 2021-09-11 LAB — HEMOCCULT STL QL IA: NEGATIVE

## 2021-10-02 DIAGNOSIS — F41.9 ANXIETY: ICD-10-CM

## 2021-10-02 RX ORDER — ESCITALOPRAM OXALATE 10 MG/1
TABLET ORAL
Qty: 90 TABLET | Refills: 0 | Status: SHIPPED | OUTPATIENT
Start: 2021-10-02 | End: 2022-02-10

## 2021-10-06 ENCOUNTER — PATIENT MESSAGE (OUTPATIENT)
Dept: PRIMARY CARE CLINIC | Age: 76
End: 2021-10-06

## 2021-10-06 DIAGNOSIS — K21.9 GASTROESOPHAGEAL REFLUX DISEASE WITHOUT ESOPHAGITIS: Primary | ICD-10-CM

## 2021-10-06 RX ORDER — PANTOPRAZOLE SODIUM 40 MG/1
40 TABLET, DELAYED RELEASE ORAL DAILY
Qty: 90 TABLET | Refills: 0 | Status: SHIPPED | OUTPATIENT
Start: 2021-10-06 | End: 2022-01-01

## 2021-10-06 NOTE — TELEPHONE ENCOUNTER
From: Madelyn Meek  To: Tabby Wiley MD  Sent: 10/6/2021 11:24 AM EDT  Subject: Prescription Question    Dr. Heller Offer. Thank you for your care. I was under the care of Dr. Ashutosh Friend. Unfortunately she moved to [place]. I will miss having her as my doctor. I called yesterday to set up any appointment with Dr. Ranjeet Kirkland with [facility] but I could not see him until January. That presents a problem. I am on pantaprazole 40 mg and only have about one month left of prescription. No refills. Could you please prescribe until I see Dr. Meggan Felix. I have been on pantaprozole for about 2 years or more. Now that I take naproxen for my stenosis I certainly need it.      Madelyn Meek

## 2021-10-06 NOTE — TELEPHONE ENCOUNTER
Requested Prescriptions     Pending Prescriptions Disp Refills    pantoprazole (Protonix) 40 mg tablet 90 Tablet 0     Sig: Take 1 Tablet by mouth daily.         Last Visit 9/3/21  Last Refill 2/11/20

## 2021-10-22 ENCOUNTER — HOSPITAL ENCOUNTER (EMERGENCY)
Age: 76
Discharge: HOME | End: 2021-10-22
Attending: EMERGENCY MEDICINE
Payer: MEDICARE

## 2021-10-22 VITALS
WEIGHT: 168.21 LBS | TEMPERATURE: 98.7 F | HEIGHT: 61 IN | OXYGEN SATURATION: 97 % | HEART RATE: 70 BPM | BODY MASS INDEX: 31.76 KG/M2 | RESPIRATION RATE: 16 BRPM | DIASTOLIC BLOOD PRESSURE: 80 MMHG | SYSTOLIC BLOOD PRESSURE: 160 MMHG

## 2021-10-22 DIAGNOSIS — I10 HYPERTENSION, UNSPECIFIED TYPE: ICD-10-CM

## 2021-10-22 DIAGNOSIS — H93.8X1 SENSATION OF FULLNESS IN RIGHT EAR: Primary | ICD-10-CM

## 2021-10-22 PROCEDURE — 99282 EMERGENCY DEPT VISIT SF MDM: CPT

## 2021-10-22 NOTE — ED TRIAGE NOTES
Pt. States she got her ears cleaned out on Wednesday and started with pulsating in ear that comes and goes and was concerned about her blood pressure.

## 2021-10-22 NOTE — ED PROVIDER NOTES
16-year-old female with history of hypertension, chronic low back pain presents with complaints of R ear fullness with 1 hour of pulsations in the right ear and concern over elevated blood pressure while at home prior to arrival.  Patient reports she called her primary care physician and discussed the pulsations in ear and elevated blood pressure to 200s over 110s and advised to come to the emergency department. Patient reports she recently had her ears cleaned 2 days ago and since then intermittently feels pulsations in her ear. Denies headache, vision changes, chest pain, shortness of breath. Patient reports she is compliant with her medication. Denies any current pulsations. Patient reports increased anxiety at home with family visitors recently as well. Denies tobacco use, drug use, alcohol use  Primary care[facility]           Past Medical History:   Diagnosis Date    Adverse effect of anesthesia     Slow to awake from Anesthesia    Asthma     Difficult intubation 12/11/15    required glidescope and bougie    Hypertension     Ill-defined condition     Tinnitus    Ill-defined condition 2011    Fractured Left ankle    Spinal stenosis        Past Surgical History:   Procedure Laterality Date    HX BREAST BIOPSY Left 2005?     Benign    HX COLONOSCOPY      HX DILATION AND CURETTAGE      for uterine polyp    HX GI      hernia    HX HEENT      cataract     HX ORTHOPAEDIC Left 2011    ORIF Left ankle         Family History:   Problem Relation Age of Onset    Stroke Mother     Colon Cancer Father     Breast Cancer Paternal Grandmother        Social History     Socioeconomic History    Marital status:      Spouse name: Not on file    Number of children: Not on file    Years of education: Not on file    Highest education level: Not on file   Occupational History    Not on file   Tobacco Use    Smoking status: Never Smoker    Smokeless tobacco: Never Used   Vaping Use    Vaping Use: Never used   Substance and Sexual Activity    Alcohol use: No    Drug use: No    Sexual activity: Not Currently   Other Topics Concern    Not on file   Social History Narrative    Not on file     Social Determinants of Health     Financial Resource Strain:     Difficulty of Paying Living Expenses:    Food Insecurity:     Worried About Running Out of Food in the Last Year:     [address] in the Last Year:    Transportation Needs:     Lack of Transportation (Medical):  Lack of Transportation (Non-Medical):    Physical Activity:     Days of Exercise per Week:     Minutes of Exercise per Session:    Stress:     Feeling of Stress :    Social Connections:     Frequency of Communication with Friends and Family:     Frequency of Social Gatherings with Friends and Family:     Attends Mu-ism Services:     Active Member of Clubs or Organizations:     Attends Club or Organization Meetings:     Marital Status:    Intimate Partner Violence:     Fear of Current or Ex-Partner:     Emotionally Abused:     Physically Abused:     Sexually Abused: ALLERGIES: Diphth,pertus(acell),tetanus; Clindamycin; and Statins-hmg-coa reductase inhibitors    Review of Systems   Constitutional: Negative for chills and fever. HENT: Positive for ear pain. Negative for ear discharge and facial swelling. Eyes: Negative for visual disturbance. Respiratory: Negative for cough and shortness of breath. Cardiovascular: Negative for chest pain. Gastrointestinal: Negative for abdominal pain, nausea and vomiting. Genitourinary: Negative for dysuria and urgency. Skin: Negative for wound. Neurological: Negative for seizures and headaches. Vitals:    10/22/21 1200 10/22/21 1312   BP: (!) 185/98 (!) 160/80   Pulse: 70    Resp: 16    Temp: 98.7 °F (37.1 °C)    SpO2: 97%    Weight: 76.3 kg (168 lb 3.4 oz)    Height: 5' 1\" (1.549 m)             Physical Exam  Vitals and nursing note reviewed. Constitutional:       Appearance: She is well-developed. HENT:      Head: Normocephalic and atraumatic. Eyes:      Pupils: Pupils are equal, round, and reactive to light. Neck:      Trachea: No tracheal deviation. Cardiovascular:      Rate and Rhythm: Normal rate and regular rhythm. Heart sounds: Normal heart sounds. Pulmonary:      Effort: Pulmonary effort is normal. No respiratory distress. Breath sounds: Normal breath sounds. No stridor. No wheezing or rales. Chest:      Chest wall: No tenderness. Abdominal:      General: Bowel sounds are normal. There is no distension. Palpations: Abdomen is soft. Tenderness: There is no abdominal tenderness. There is no rebound. Musculoskeletal:         General: No tenderness. Normal range of motion. Cervical back: Normal range of motion and neck supple. Skin:     General: Skin is warm and dry. Coloration: Skin is not pale. Findings: No rash. Neurological:      Mental Status: She is alert and oriented to person, place, and time. Cranial Nerves: No cranial nerve deficit. Sensory: No sensory deficit. Psychiatric:         Mood and Affect: Mood is anxious. MDM  Number of Diagnoses or Management Options  Hypertension, unspecified type  Sensation of fullness in right ear  Diagnosis management comments: 43-year-old female with history of hypertension and anxiety presents with complaints of increasing anxiousness and elevated blood pressure at home this morning. Patient is anxious, well-appearing, nontoxic, no respiratory distress, clear to auscultation bilaterally, bilateral TMs clear. Blood pressure much improved since arrival to emergency department and improved while in the emergency department. Patient reports ear pulsations and symptoms resolved on arrival to emergency department. Advised to follow-up with primary care physician regarding blood pressure management and anxiety.     Patient Progress  Patient progress: improved         Procedures

## 2021-10-28 ENCOUNTER — NURSE TRIAGE (OUTPATIENT)
Dept: OTHER | Facility: CLINIC | Age: 76
End: 2021-10-28

## 2021-10-28 NOTE — TELEPHONE ENCOUNTER
Pt called after recently being seen in the ED for High Blood pressure and ear issues. Pt denies worsening symptoms but wants to f/u with PCP. Warm transfer to Julio Bonds in the [facility] ([facility]) for scheduling needs.

## 2021-11-01 ENCOUNTER — OFFICE VISIT (OUTPATIENT)
Dept: PRIMARY CARE CLINIC | Age: 76
End: 2021-11-01
Payer: MEDICARE

## 2021-11-01 VITALS
DIASTOLIC BLOOD PRESSURE: 82 MMHG | RESPIRATION RATE: 18 BRPM | SYSTOLIC BLOOD PRESSURE: 128 MMHG | HEART RATE: 64 BPM | OXYGEN SATURATION: 96 % | BODY MASS INDEX: 30.58 KG/M2 | TEMPERATURE: 97.8 F | HEIGHT: 61 IN | WEIGHT: 162 LBS

## 2021-11-01 DIAGNOSIS — F41.9 ANXIETY: ICD-10-CM

## 2021-11-01 DIAGNOSIS — E78.2 MIXED HYPERLIPIDEMIA: ICD-10-CM

## 2021-11-01 DIAGNOSIS — Z23 ENCOUNTER FOR IMMUNIZATION: ICD-10-CM

## 2021-11-01 DIAGNOSIS — M48.061 DEGENERATIVE LUMBAR SPINAL STENOSIS: ICD-10-CM

## 2021-11-01 DIAGNOSIS — Z23 NEED FOR SHINGLES VACCINE: ICD-10-CM

## 2021-11-01 DIAGNOSIS — I10 PRIMARY HYPERTENSION: Primary | ICD-10-CM

## 2021-11-01 DIAGNOSIS — F45.8 BRUXISM (TEETH GRINDING): ICD-10-CM

## 2021-11-01 LAB
ALBUMIN SERPL-MCNC: 3.8 G/DL (ref 3.5–5)
ALBUMIN/GLOB SERPL: 1.2 {RATIO} (ref 1.1–2.2)
ALP SERPL-CCNC: 61 U/L (ref 45–117)
ALT SERPL-CCNC: 17 U/L (ref 12–78)
ANION GAP SERPL CALC-SCNC: 2 MMOL/L (ref 5–15)
AST SERPL-CCNC: 13 U/L (ref 15–37)
BILIRUB SERPL-MCNC: 0.5 MG/DL (ref 0.2–1)
BUN SERPL-MCNC: 31 MG/DL (ref 6–20)
BUN/CREAT SERPL: 40 (ref 12–20)
CALCIUM SERPL-MCNC: 9.1 MG/DL (ref 8.5–10.1)
CHLORIDE SERPL-SCNC: 107 MMOL/L (ref 97–108)
CHOLEST SERPL-MCNC: 205 MG/DL
CO2 SERPL-SCNC: 29 MMOL/L (ref 21–32)
CREAT SERPL-MCNC: 0.78 MG/DL (ref 0.55–1.02)
GLOBULIN SER CALC-MCNC: 3.2 G/DL (ref 2–4)
GLUCOSE SERPL-MCNC: 87 MG/DL (ref 65–100)
HDLC SERPL-MCNC: 70 MG/DL
HDLC SERPL: 2.9 {RATIO} (ref 0–5)
LDLC SERPL CALC-MCNC: 112.8 MG/DL (ref 0–100)
POTASSIUM SERPL-SCNC: 4.2 MMOL/L (ref 3.5–5.1)
PROT SERPL-MCNC: 7 G/DL (ref 6.4–8.2)
SODIUM SERPL-SCNC: 138 MMOL/L (ref 136–145)
TRIGL SERPL-MCNC: 111 MG/DL (ref ?–150)
VLDLC SERPL CALC-MCNC: 22.2 MG/DL

## 2021-11-01 PROCEDURE — 99214 OFFICE O/P EST MOD 30 MIN: CPT | Performed by: INTERNAL MEDICINE

## 2021-11-01 PROCEDURE — G8754 DIAS BP LESS 90: HCPCS | Performed by: INTERNAL MEDICINE

## 2021-11-01 PROCEDURE — 1090F PRES/ABSN URINE INCON ASSESS: CPT | Performed by: INTERNAL MEDICINE

## 2021-11-01 PROCEDURE — G8427 DOCREV CUR MEDS BY ELIG CLIN: HCPCS | Performed by: INTERNAL MEDICINE

## 2021-11-01 PROCEDURE — 90694 VACC AIIV4 NO PRSRV 0.5ML IM: CPT | Performed by: INTERNAL MEDICINE

## 2021-11-01 PROCEDURE — G8419 CALC BMI OUT NRM PARAM NOF/U: HCPCS | Performed by: INTERNAL MEDICINE

## 2021-11-01 PROCEDURE — G8536 NO DOC ELDER MAL SCRN: HCPCS | Performed by: INTERNAL MEDICINE

## 2021-11-01 PROCEDURE — G8752 SYS BP LESS 140: HCPCS | Performed by: INTERNAL MEDICINE

## 2021-11-01 PROCEDURE — G8399 PT W/DXA RESULTS DOCUMENT: HCPCS | Performed by: INTERNAL MEDICINE

## 2021-11-01 PROCEDURE — 3017F COLORECTAL CA SCREEN DOC REV: CPT | Performed by: INTERNAL MEDICINE

## 2021-11-01 PROCEDURE — G0008 ADMIN INFLUENZA VIRUS VAC: HCPCS | Performed by: INTERNAL MEDICINE

## 2021-11-01 PROCEDURE — G8510 SCR DEP NEG, NO PLAN REQD: HCPCS | Performed by: INTERNAL MEDICINE

## 2021-11-01 PROCEDURE — 1101F PT FALLS ASSESS-DOCD LE1/YR: CPT | Performed by: INTERNAL MEDICINE

## 2021-11-01 RX ORDER — ZOSTER VACCINE RECOMBINANT, ADJUVANTED 50 MCG/0.5
0.5 KIT INTRAMUSCULAR ONCE
Qty: 0.5 ML | Refills: 0 | Status: SHIPPED | OUTPATIENT
Start: 2021-11-01 | End: 2021-11-01

## 2021-11-01 NOTE — PROGRESS NOTES
Taylor Farrell (: 1945) is a 77 y.o. female, established patient, here for evaluation of the following chief complaint(s):  ED Follow-up and Immunization/Injection (influenza vaccine)     Written by Arnel Najera, as dictated by Dr. Erick Crowder MD.      ASSESSMENT/PLAN:  Below is the assessment and plan developed based on review of pertinent history, physical exam, labs, studies, and medications. 1. Primary hypertension  Well controlled on current medications. Recent visit to the ED for elevated BP was most likely secondary to anxiety. Continue taking HCTZ 12.5 mg and lisinopril 40 mg as prescribed. Ordered labs to check metabolic panel. Waiting for results. -     METABOLIC PANEL, COMPREHENSIVE; Future    2. Bruxism (teeth grinding)  Continue using mouth guard as prescribed. Followed by Dentist.     3. Encounter for immunization  Administered an influenza vaccine in the office today. She tolerated the vaccine well. -     FLU (FLUAD QUAD INFLUENZA VACCINE,QUAD,ADJUVANTED)    4. Degenerative lumbar spinal stenosis  Continue taking baclofen 10 mg, gabapentin 300 mg, and Flexeril 10 mg as prescribed. 5. Mixed hyperlipidemia  Continue taking lovastatin 20 mg as prescribed. Ordered labs to check lipid panel. Waiting for results. -     LIPID PANEL; Future    6. Anxiety  Continue taking Lexapro 10 mg as prescribed. 7. Need for shingles vaccine  Prescribed a Shingrix vaccine to be administered at her local pharmacy. -     varicella-zoster recombinant, PF, (Shingrix, PF,) 50 mcg/0.5 mL susr injection; 0.5 mL by IntraMUSCular route once for 1 dose., Normal, Disp-0.5 mL, R-0 sent to pharmacy. SUBJECTIVE/OBJECTIVE:  HPI    Patient presents today for a follow up visit. She presented to the ED on 10/22 for elevated BP and R ear fullness with pulsations. She checked her BP at home before presenting the ED and it was 200/110. Her BP at the ED was 185/98.  She takes HCTZ 12.5 mg and lisinopril 40 mg for HTN. Her BP today is 113/76, 128/82 manual repeat. She takes Lexapro 10 mg for anxiety. She c/o teeth grinding and she uses a mouth guard at night. She is followed by the Dentist for the teeth grinding. She takes lovastatin 20 mg for hyperlipidemia. She takes baclofen 10 mg, gabapentin 300 mg, and Flexeril 10 mg for spinal stenosis pain. Patient Active Problem List   Diagnosis Code    Essential hypertension I10    Degenerative lumbar spinal stenosis M48.061    Mixed hyperlipidemia E78.2        Current Outpatient Medications on File Prior to Visit   Medication Sig Dispense Refill    pantoprazole (Protonix) 40 mg tablet Take 1 Tablet by mouth daily. 90 Tablet 0    escitalopram oxalate (LEXAPRO) 10 mg tablet TAKE 1 TABLET BY MOUTH DAILY 90 Tablet 0    baclofen (LIORESAL) 10 mg tablet Take 10 mg by mouth daily.  gabapentin (NEURONTIN) 300 mg capsule Take 300 mg by mouth two (2) times a day.  naproxen (NAPROSYN) 500 mg tablet Take 500 mg by mouth two (2) times daily (with meals).  DurezoL 0.05 % ophthalmic emulsion       lovastatin (MEVACOR) 20 mg tablet TAKE 1 TABLET BY MOUTH EVERY OTHER DAY 45 Tablet 0    hydroCHLOROthiazide (HYDRODIURIL) 12.5 mg tablet TAKE 1 TABLET BY MOUTH DAILY 90 Tablet 0    lisinopriL (PRINIVIL, ZESTRIL) 40 mg tablet TAKE 1 TABLET BY MOUTH DAILY 90 Tablet 0    co-enzyme Q-10 (CO Q-10) 100 mg capsule Take 200 mg by mouth daily (after dinner).  cyclobenzaprine (FLEXERIL) 10 mg tablet Take 1 Tablet by mouth three (3) times daily as needed for Muscle Spasm(s). (Patient not taking: Reported on 9/3/2021) 18 Tablet 0    albuterol (PROAIR HFA) 90 mcg/actuation inhaler Take  by inhalation. (Patient not taking: Reported on 9/3/2021)       No current facility-administered medications on file prior to visit. Allergies   Allergen Reactions    Diphth,Pertus(Acell),Tetanus Rash    Clindamycin Diarrhea     \"It gave me C-Dif. \"   Odom Statins-Hmg-Coa Reductase Inhibitors Other (comments)     \"It makes all of my joints ache. \"       Past Medical History:   Diagnosis Date    Adverse effect of anesthesia     Slow to awake from Anesthesia    Asthma     Difficult intubation 12/11/15    required glidescope and bougie    Hypertension     Ill-defined condition     Tinnitus    Ill-defined condition 2011    Fractured Left ankle    Spinal stenosis        Past Surgical History:   Procedure Laterality Date    HX BREAST BIOPSY Left 2005? Benign    HX COLONOSCOPY      HX DILATION AND CURETTAGE      for uterine polyp    HX GI      hernia    HX HEENT      cataract     HX ORTHOPAEDIC Left 2011    ORIF Left ankle       Family History   Problem Relation Age of Onset    Stroke Mother     Colon Cancer Father     Breast Cancer Paternal Grandmother        Social History     Socioeconomic History    Marital status:      Spouse name: Not on file    Number of children: Not on file    Years of education: Not on file    Highest education level: Not on file   Occupational History    Not on file   Tobacco Use    Smoking status: Never Smoker    Smokeless tobacco: Never Used   Vaping Use    Vaping Use: Never used   Substance and Sexual Activity    Alcohol use: No    Drug use: No    Sexual activity: Not Currently   Other Topics Concern    Not on file   Social History Narrative    Not on file     Social Determinants of Health     Financial Resource Strain:     Difficulty of Paying Living Expenses:    Food Insecurity:     Worried About Running Out of Food in the Last Year:     [address] in the Last Year:    Transportation Needs:     Lack of Transportation (Medical):      Lack of Transportation (Non-Medical):    Physical Activity:     Days of Exercise per Week:     Minutes of Exercise per Session:    Stress:     Feeling of Stress :    Social Connections:     Frequency of Communication with Friends and Family:     Frequency of Social Gatherings with Friends and Family:     Attends U[employer] Services:     Active Member of Clubs or Organizations:     Attends Club or Organization Meetings:     Marital Status:    Intimate Partner Violence:     Fear of Current or Ex-Partner:     Emotionally Abused:     Physically Abused:     Sexually Abused:        Office Visit on 09/03/2021   Component Date Value Ref Range Status    Occult blood fecal, by IA 09/09/2021 Negative  Negative Final       Review of Systems   Constitutional: Negative for activity change, fatigue and unexpected weight change. HENT: Negative for congestion, hearing loss, rhinorrhea and sore throat. Eyes: Negative for discharge. Respiratory: Negative for cough, chest tightness and shortness of breath. Cardiovascular: Negative for leg swelling. Gastrointestinal: Negative for abdominal pain, constipation and diarrhea. Genitourinary: Negative for dysuria, flank pain, frequency and urgency. Musculoskeletal: Negative for arthralgias, back pain and myalgias. Skin: Negative for color change and rash. Neurological: Negative for dizziness, light-headedness and headaches. Psychiatric/Behavioral: Negative for dysphoric mood and sleep disturbance. The patient is nervous/anxious. Visit Vitals  /82 (BP 1 Location: Right arm)   Pulse 64   Temp 97.8 °F (36.6 °C) (Temporal)   Resp 18   Ht 5' 1\" (1.549 m)   Wt 162 lb (73.5 kg)   SpO2 96%   BMI 30.61 kg/m²       Physical Exam  Vitals and nursing note reviewed. Constitutional:       General: She is not in acute distress. Appearance: Normal appearance. She is well-developed. She is not diaphoretic. HENT:      Right Ear: External ear normal.      Left Ear: External ear normal.   Eyes:      General: No scleral icterus. Right eye: No discharge. Left eye: No discharge. Extraocular Movements: Extraocular movements intact.       Conjunctiva/sclera: Conjunctivae normal.   Cardiovascular: Rate and Rhythm: Normal rate and regular rhythm. Pulmonary:      Effort: Pulmonary effort is normal.      Breath sounds: Normal breath sounds. No wheezing. Abdominal:      General: Bowel sounds are normal.      Palpations: Abdomen is soft. Tenderness: There is no abdominal tenderness. Musculoskeletal:      Cervical back: Normal range of motion and neck supple. Lymphadenopathy:      Cervical: No cervical adenopathy. Neurological:      Mental Status: She is alert and oriented to person, place, and time. Psychiatric:         Mood and Affect: Mood and affect normal.             An electronic signature was used to authenticate this note.   -- Ron Reevess

## 2021-11-01 NOTE — PROGRESS NOTES
Chief Complaint   Patient presents with    ED Follow-up    Immunization/Injection     influenza vaccine     Patient provided with most updated VIS prior to administration. Opportunity given for questions and concerns provided. No concerns at this time    Immunizations administered to patient 11/1/2021 by Concepción Vázquez LPN with consent. Patient tolerated procedure well. No reactions noted.

## 2021-11-05 NOTE — PROGRESS NOTES
Results reviewed. Release via [employer] Dr Luke, hope you are feeling good. Your cholesterol is back and I think we should not pay attention to these numbers as you were not fasting. We can repeat after the Holidays.

## 2021-11-13 DIAGNOSIS — I10 ESSENTIAL HYPERTENSION: ICD-10-CM

## 2021-11-13 RX ORDER — LISINOPRIL 40 MG/1
TABLET ORAL
Qty: 90 TABLET | Refills: 0 | Status: SHIPPED | OUTPATIENT
Start: 2021-11-13 | End: 2022-01-04

## 2021-11-13 RX ORDER — HYDROCHLOROTHIAZIDE 12.5 MG/1
TABLET ORAL
Qty: 90 TABLET | Refills: 0 | Status: SHIPPED | OUTPATIENT
Start: 2021-11-13 | End: 2022-01-04

## 2021-11-15 DIAGNOSIS — F41.9 ANXIETY: ICD-10-CM

## 2021-11-15 DIAGNOSIS — E78.49 OTHER HYPERLIPIDEMIA: ICD-10-CM

## 2021-11-15 RX ORDER — ESCITALOPRAM OXALATE 5 MG/1
TABLET ORAL
Qty: 90 TABLET | Refills: 0 | OUTPATIENT
Start: 2021-11-15

## 2021-11-15 RX ORDER — LOVASTATIN 20 MG/1
TABLET ORAL
Qty: 45 TABLET | Refills: 0 | Status: SHIPPED | OUTPATIENT
Start: 2021-11-15 | End: 2022-01-01

## 2021-11-18 ENCOUNTER — TELEPHONE (OUTPATIENT)
Dept: PRIMARY CARE CLINIC | Age: 76
End: 2021-11-18

## 2021-11-18 NOTE — TELEPHONE ENCOUNTER
Georgiann London called wanting a diagnosis code for the occult blood immunoassay done back in 9/2021. They can be reached at [phone].

## 2022-01-01 DIAGNOSIS — K21.9 GASTROESOPHAGEAL REFLUX DISEASE WITHOUT ESOPHAGITIS: ICD-10-CM

## 2022-01-01 DIAGNOSIS — E78.49 OTHER HYPERLIPIDEMIA: ICD-10-CM

## 2022-01-01 RX ORDER — LOVASTATIN 20 MG/1
TABLET ORAL
Qty: 45 TABLET | Refills: 0 | Status: SHIPPED | OUTPATIENT
Start: 2022-01-01 | End: 2022-05-09

## 2022-01-01 RX ORDER — PANTOPRAZOLE SODIUM 40 MG/1
40 TABLET, DELAYED RELEASE ORAL DAILY
Qty: 90 TABLET | Refills: 0 | Status: SHIPPED | OUTPATIENT
Start: 2022-01-01 | End: 2022-03-23

## 2022-01-04 DIAGNOSIS — I10 ESSENTIAL HYPERTENSION: ICD-10-CM

## 2022-01-04 RX ORDER — LISINOPRIL 40 MG/1
TABLET ORAL
Qty: 90 TABLET | Refills: 0 | Status: SHIPPED | OUTPATIENT
Start: 2022-01-04 | End: 2022-05-09

## 2022-01-04 RX ORDER — HYDROCHLOROTHIAZIDE 12.5 MG/1
TABLET ORAL
Qty: 90 TABLET | Refills: 0 | Status: SHIPPED | OUTPATIENT
Start: 2022-01-04 | End: 2022-05-03

## 2022-02-10 DIAGNOSIS — F41.9 ANXIETY: ICD-10-CM

## 2022-02-10 RX ORDER — ESCITALOPRAM OXALATE 10 MG/1
TABLET ORAL
Qty: 90 TABLET | Refills: 0 | Status: SHIPPED | OUTPATIENT
Start: 2022-02-10 | End: 2022-05-09

## 2022-03-19 PROBLEM — M48.061 DEGENERATIVE LUMBAR SPINAL STENOSIS: Status: ACTIVE | Noted: 2021-11-01

## 2022-03-19 PROBLEM — E78.2 MIXED HYPERLIPIDEMIA: Status: ACTIVE | Noted: 2021-11-01

## 2022-03-19 PROBLEM — I10 ESSENTIAL HYPERTENSION: Status: ACTIVE | Noted: 2020-02-14

## 2022-03-21 ENCOUNTER — HOSPITAL ENCOUNTER (OUTPATIENT)
Dept: PREADMISSION TESTING | Age: 77
Discharge: HOME | End: 2022-03-21
Attending: SPECIALIST
Payer: MEDICARE

## 2022-03-21 ENCOUNTER — TRANSCRIBE ORDER (OUTPATIENT)
Dept: REGISTRATION | Age: 77
End: 2022-03-21

## 2022-03-21 DIAGNOSIS — U07.1 COVID-19: Primary | ICD-10-CM

## 2022-03-21 DIAGNOSIS — U07.1 COVID-19: ICD-10-CM

## 2022-03-21 PROCEDURE — U0005 INFEC AGEN DETEC AMPLI PROBE: HCPCS

## 2022-03-22 LAB
SARS-COV-2, XPLCVT: NOT DETECTED
SOURCE, COVRS: NORMAL

## 2022-03-23 DIAGNOSIS — K21.9 GASTROESOPHAGEAL REFLUX DISEASE WITHOUT ESOPHAGITIS: ICD-10-CM

## 2022-03-23 RX ORDER — PANTOPRAZOLE SODIUM 40 MG/1
40 TABLET, DELAYED RELEASE ORAL DAILY
Qty: 90 TABLET | Refills: 0 | Status: SHIPPED | OUTPATIENT
Start: 2022-03-23 | End: 2022-05-09

## 2022-03-24 ENCOUNTER — HOSPITAL ENCOUNTER (OUTPATIENT)
Age: 77
Setting detail: OUTPATIENT SURGERY
Discharge: HOME | End: 2022-03-24
Attending: SPECIALIST | Admitting: SPECIALIST
Payer: MEDICARE

## 2022-03-24 ENCOUNTER — ANESTHESIA EVENT (OUTPATIENT)
Dept: ENDOSCOPY | Age: 77
End: 2022-03-24
Payer: MEDICARE

## 2022-03-24 ENCOUNTER — ANESTHESIA (OUTPATIENT)
Dept: ENDOSCOPY | Age: 77
End: 2022-03-24
Payer: MEDICARE

## 2022-03-24 VITALS
DIASTOLIC BLOOD PRESSURE: 69 MMHG | BODY MASS INDEX: 30.58 KG/M2 | RESPIRATION RATE: 14 BRPM | SYSTOLIC BLOOD PRESSURE: 139 MMHG | TEMPERATURE: 98 F | OXYGEN SATURATION: 95 % | HEART RATE: 54 BPM | WEIGHT: 162 LBS | HEIGHT: 61 IN

## 2022-03-24 PROCEDURE — 2709999900 HC NON-CHARGEABLE SUPPLY: Performed by: SPECIALIST

## 2022-03-24 PROCEDURE — 74011000250 HC RX REV CODE- 250: Performed by: NURSE ANESTHETIST, CERTIFIED REGISTERED

## 2022-03-24 PROCEDURE — 88305 TISSUE EXAM BY PATHOLOGIST: CPT

## 2022-03-24 PROCEDURE — 77030021593 HC FCPS BIOP ENDOSC BSC -A: Performed by: SPECIALIST

## 2022-03-24 PROCEDURE — 74011250636 HC RX REV CODE- 250/636: Performed by: NURSE ANESTHETIST, CERTIFIED REGISTERED

## 2022-03-24 PROCEDURE — 76040000019: Performed by: SPECIALIST

## 2022-03-24 PROCEDURE — 76060000031 HC ANESTHESIA FIRST 0.5 HR: Performed by: SPECIALIST

## 2022-03-24 RX ORDER — DEXTROMETHORPHAN/PSEUDOEPHED 2.5-7.5/.8
1.2 DROPS ORAL
Status: DISCONTINUED | OUTPATIENT
Start: 2022-03-24 | End: 2022-03-24 | Stop reason: HOSPADM

## 2022-03-24 RX ORDER — FLUMAZENIL 0.1 MG/ML
0.2 INJECTION INTRAVENOUS
Status: DISCONTINUED | OUTPATIENT
Start: 2022-03-24 | End: 2022-03-24 | Stop reason: HOSPADM

## 2022-03-24 RX ORDER — PROPOFOL 10 MG/ML
INJECTION, EMULSION INTRAVENOUS AS NEEDED
Status: DISCONTINUED | OUTPATIENT
Start: 2022-03-24 | End: 2022-03-24 | Stop reason: HOSPADM

## 2022-03-24 RX ORDER — SODIUM CHLORIDE 9 MG/ML
INJECTION, SOLUTION INTRAVENOUS
Status: DISCONTINUED | OUTPATIENT
Start: 2022-03-24 | End: 2022-03-24 | Stop reason: HOSPADM

## 2022-03-24 RX ORDER — SODIUM CHLORIDE 0.9 % (FLUSH) 0.9 %
5-40 SYRINGE (ML) INJECTION EVERY 8 HOURS
Status: DISCONTINUED | OUTPATIENT
Start: 2022-03-24 | End: 2022-03-24 | Stop reason: HOSPADM

## 2022-03-24 RX ORDER — SODIUM CHLORIDE 9 MG/ML
50 INJECTION, SOLUTION INTRAVENOUS CONTINUOUS
Status: DISCONTINUED | OUTPATIENT
Start: 2022-03-24 | End: 2022-03-24 | Stop reason: HOSPADM

## 2022-03-24 RX ORDER — LIDOCAINE HYDROCHLORIDE 20 MG/ML
INJECTION, SOLUTION INFILTRATION; PERINEURAL AS NEEDED
Status: DISCONTINUED | OUTPATIENT
Start: 2022-03-24 | End: 2022-03-24 | Stop reason: HOSPADM

## 2022-03-24 RX ORDER — NALOXONE HYDROCHLORIDE 0.4 MG/ML
0.4 INJECTION, SOLUTION INTRAMUSCULAR; INTRAVENOUS; SUBCUTANEOUS
Status: DISCONTINUED | OUTPATIENT
Start: 2022-03-24 | End: 2022-03-24 | Stop reason: HOSPADM

## 2022-03-24 RX ORDER — ATROPINE SULFATE 0.1 MG/ML
0.5 INJECTION INTRAVENOUS
Status: DISCONTINUED | OUTPATIENT
Start: 2022-03-24 | End: 2022-03-24 | Stop reason: HOSPADM

## 2022-03-24 RX ORDER — SODIUM CHLORIDE 0.9 % (FLUSH) 0.9 %
5-40 SYRINGE (ML) INJECTION AS NEEDED
Status: DISCONTINUED | OUTPATIENT
Start: 2022-03-24 | End: 2022-03-24 | Stop reason: HOSPADM

## 2022-03-24 RX ORDER — EPINEPHRINE 0.1 MG/ML
1 INJECTION INTRACARDIAC; INTRAVENOUS
Status: DISCONTINUED | OUTPATIENT
Start: 2022-03-24 | End: 2022-03-24 | Stop reason: HOSPADM

## 2022-03-24 RX ADMIN — LIDOCAINE HYDROCHLORIDE 60 MG: 20 INJECTION, SOLUTION INFILTRATION; PERINEURAL at 12:43

## 2022-03-24 RX ADMIN — PROPOFOL 50 MG: 10 INJECTION, EMULSION INTRAVENOUS at 12:46

## 2022-03-24 RX ADMIN — PROPOFOL 100 MG: 10 INJECTION, EMULSION INTRAVENOUS at 12:43

## 2022-03-24 RX ADMIN — SODIUM CHLORIDE: 900 INJECTION, SOLUTION INTRAVENOUS at 12:38

## 2022-03-24 NOTE — PERIOP NOTES

## 2022-03-24 NOTE — ANESTHESIA PREPROCEDURE EVALUATION
Anesthetic History     Increased risk of difficult airway (Required Glidscope and bougie (12/11/15))          Review of Systems / Medical History  Patient summary reviewed, nursing notes reviewed and pertinent labs reviewed    Pulmonary            Asthma        Neuro/Psych             Comments: Degenerative lumbar spinal stenosis Cardiovascular    Hypertension          Hyperlipidemia    Exercise tolerance: >4 METS  Comments: Stress Echo (2/13/20):  Normal stress echocardiogram. Low risk study. ECG (2/11/20):   Sinus bradycardia   Minimal voltage criteria for LVH, may be normal variant   Borderline ECG    GI/Hepatic/Renal     GERD          Comments: Poole's Esophagus    FHx colon cancer Endo/Other  Within defined limits           Other Findings            Physical Exam    Airway  Mallampati: II  TM Distance: 4 - 6 cm  Neck ROM: normal range of motion   Mouth opening: Normal     Cardiovascular    Rhythm: regular  Rate: normal         Dental    Dentition: Upper partial plate and Lower partial plate     Pulmonary  Breath sounds clear to auscultation               Abdominal  GI exam deferred       Other Findings            Anesthetic Plan    ASA: 2  Anesthesia type: MAC          Induction: Intravenous  Anesthetic plan and risks discussed with: Patient

## 2022-03-24 NOTE — DISCHARGE INSTRUCTIONS
Leola Mendoza  [id number]  1945    EGD DISCHARGE INSTRUCTIONS  Discomfort:  Sore throat- throat lozenges or warm salt water gargle  redness at IV site- apply warm compress to area; if redness or soreness persist- contact your physician  Gaseous discomfort- walking, belching will help relieve any discomfort    DIET  You may resume your regular diet - however -  remember your colon is empty and a heavy meal will produce gas. Avoid these foods:  vegetables, fried / greasy foods, carbonated drinks  You may not drink alcoholic beverages for at least 12 hours    MEDICATIONS   Regarding Aspirin or Nonsteroidal medications specifically, please see below. ACTIVITY  You may resume your normal daily activities. Spend the remainder of the day resting -  avoid any strenuous activity. You may not operate a vehicle for 12 hours  You may not engage in an occupation involving machinery or appliances for rest of today. Avoid making any critical decisions for at least 24 hour    CALL M.D. ANY SIGN OF   Increasing pain, nausea, vomiting  Abdominal distension (swelling)  New increased bleeding (oral or rectal)  Fever (chills)  Pain in chest area  Bloody discharge from nose or mouth  Shortness of breath    You may not  take any Advil, Aspirin, Ibuprofen, Motrin, Aleve, or Goodys for 10 days, ONLY  Tylenol as needed for pain.     Post procedure diagnosis: Poole's Esophagus  Gastritis      Follow-up Instructions:   Call Dr. Virginie Brown  Results of procedure / biopsy in 10 days  Telephone #  [phone]        DISCHARGE SUMMARY from Nurse    The following personal items collected during your admission are returned to you:   Dental Appliance:    Vision: Visual Aid: Glasses  Hearing Aid:    Jewelry:    Clothing:    Other Valuables:    Valuables sent to safe:

## 2022-03-24 NOTE — ANESTHESIA POSTPROCEDURE EVALUATION
Procedure(s):  ESOPHAGOGASTRODUODENOSCOPY (EGD)   :-  ESOPHAGOGASTRODUODENAL (EGD) BIOPSY. MAC    Anesthesia Post Evaluation        Patient location during evaluation: PACU  Note status: Adequate. Level of consciousness: responsive to verbal stimuli and sleepy but conscious  Pain management: satisfactory to patient  Airway patency: patent  Anesthetic complications: no  Cardiovascular status: acceptable  Respiratory status: acceptable  Hydration status: acceptable  Comments: +Post-Anesthesia Evaluation and Assessment    Patient: Seema Desouza MRN: [id number]  SSN: [id number]   YOB: 1945  Age: 69 y.o. Sex: female      Cardiovascular Function/Vital Signs    BP (!) 106/57   Pulse 60   Temp 36.7 °C (98 °F)   Resp 19   Ht 5' 1\" (1.549 m)   Wt 73.5 kg (162 lb)   SpO2 96%   Breastfeeding No   BMI 30.61 kg/m²     Patient is status post Procedure(s):  ESOPHAGOGASTRODUODENOSCOPY (EGD)   :-  ESOPHAGOGASTRODUODENAL (EGD) BIOPSY. Nausea/Vomiting: Controlled. Postoperative hydration reviewed and adequate. Pain:  Pain Scale 1: Numeric (0 - 10) (03/24/22 1305)  Pain Intensity 1: 0 (03/24/22 1305)   Managed. Neurological Status: At baseline. Mental Status and Level of Consciousness: Arousable. Pulmonary Status:   O2 Device: None (Room air) (03/24/22 1305)   Adequate oxygenation and airway patent. Complications related to anesthesia: None    Post-anesthesia assessment completed. No concerns. Signed By: Tomás Grayson MD    3/24/2022  Post anesthesia nausea and vomiting:  controlled      INITIAL Post-op Vital signs:   Vitals Value Taken Time   /110 03/24/22 1310   Temp 36.7 °C (98 °F) 03/24/22 1258   Pulse 56 03/24/22 1313   Resp 21 03/24/22 1313   SpO2 96 % 03/24/22 1313   Vitals shown include unvalidated device data.

## 2022-03-24 NOTE — PROCEDURES
[address]  [address]                 NAME:  Oneida Hitchcock   :   1945   MRN:   [id number]     Date/Time:  3/24/2022 12:53 PM    Esophagogastroduodenoscopy (EGD) Procedure Note    :  Mague Rosenbaum MD    Staff: Endoscopy Technician-1: Miguel CALDERÓN  Endoscopy RN-1: Izabela Chin RN     Referring Provider:  Zoe Benitez MD    Anethesia/Sedation:  MAC anesthesia Propofol    Preoperative diagnosis: BARRETTS,     Postoperative diagnosis: Poole esophagus, gastritis    Procedure Details     After infom consent was obtained for the procedure, with all risks and benefits of procedure explained the patient was taken to the endoscopy suite and placed in the left lateral decubitus position. Following sequential administration of sedation as per above, the [id number] gastroscope was inserted into the mouth and advanced under direct vision to second portion of the duodenum. A careful inspection was made as the gastroscope was withdrawn, including a retroflexed view of the proximal stomach; findings and interventions are described below. Findings:  Esophagus:Poole mucosa seen at 35 cm, four quadrant biopsies obtained at 35, 36 cm  Stomach:Patchy antral erythema and erosions, biopsies done  Duodenum/jejunum:normal      Therapies:  none    Specimens: antral, distal esophagus bx           EBL: None    Complications:   None; patient tolerated the procedure well. Impression:    See Postoperative diagnosis above    Recommendations:  -Acid suppression with a proton pump inhibitor. , -Await pathology. , -No NSAIDS    Discharge disposition:  Home in the company of  when able to ambulate    Mague Rosenbaum MD

## 2022-03-24 NOTE — PROGRESS NOTES
Katherine Bower  1945  [id number]    Situation:  Verbal report received from: Corrinne Furnace, procedural RN  Procedure: Procedure(s):  ESOPHAGOGASTRODUODENOSCOPY (EGD)   :-  ESOPHAGOGASTRODUODENAL (EGD) BIOPSY    Background:    Preoperative diagnosis: BARRETTS, FAMILY HISTORY COLON CANCER  Postoperative diagnosis: Poole's Esophagus  Gastritis    :  Dr. Sherley Uriarte  Assistant(s): Endoscopy Technician-1: Devin Redman  Endoscopy RN-1: Ashish Cardona RN    Specimens:   ID Type Source Tests Collected by Time Destination   1 : antrum bx Preservative Gastric  Brittni Germain MD 3/24/2022 1249 Pathology   2 : distal esophagus @ 39 cm Preservative Esophagus, Distal  Brittni Germain MD 3/24/2022 1252 Pathology   3 : distal esophagus @ 35 cm Preservative Esophagus, Distal  Brittni Germain MD 3/24/2022 1252 Pathology     H. Pylori  no      Anesthesia gave intra-procedure sedation and medications, see anesthesia flow sheet yes    Intravenous fluids: NS@ KVO     Vital signs stable     Abdominal assessment: round and soft     Recommendation:  Discharge patient per MD order.   Return to floor  Friend in waiting room   Permission to share finding with family or friend yes

## 2022-03-24 NOTE — H&P
Pre-endoscopy H and P     The patient was seen and examined in the endoscopy suite. The airway was assessed and docuemented. The problem list and medications were reviewed. Patient Active Problem List   Diagnosis Code    Essential hypertension I10    Degenerative lumbar spinal stenosis M48.061    Mixed hyperlipidemia E78.2     Social History     Socioeconomic History    Marital status:      Spouse name: Not on file    Number of children: Not on file    Years of education: Not on file    Highest education level: Not on file   Occupational History    Not on file   Tobacco Use    Smoking status: Never Smoker    Smokeless tobacco: Never Used   Vaping Use    Vaping Use: Never used   Substance and Sexual Activity    Alcohol use: No    Drug use: No    Sexual activity: Not Currently   Other Topics Concern    Not on file   Social History Narrative    Not on file     Social Determinants of Health     Financial Resource Strain:     Difficulty of Paying Living Expenses: Not on file   Food Insecurity:     Worried About [address] in the Last Year: Not on file    [address] in the Last Year: Not on file   Transportation Needs:     Lack of Transportation (Medical): Not on file    Lack of Transportation (Non-Medical):  Not on file   Physical Activity:     Days of Exercise per Week: Not on file    Minutes of Exercise per Session: Not on file   Stress:     Feeling of Stress : Not on file   Social Connections:     Frequency of Communication with Friends and Family: Not on file    Frequency of Social Gatherings with Friends and Family: Not on file    Attends Voodoo Services: Not on file    Active Member of Clubs or Organizations: Not on file    Attends Club or Organization Meetings: Not on file    Marital Status: Not on file   Intimate Partner Violence:     Fear of Current or Ex-Partner: Not on file    Emotionally Abused: Not on file    Physically Abused: Not on file   [facility] Sexually Abused: Not on file   Housing Stability:     Unable to Pay for Housing in the Last Year: Not on file    Number of Places Lived in the Last Year: Not on file    Unstable Housing in the Last Year: Not on file     Past Medical History:   Diagnosis Date    Adverse effect of anesthesia     Slow to awake from Anesthesia    Asthma     Difficult intubation 12/11/15    required glidescope and bougie    GERD (gastroesophageal reflux disease)     Poole's    Hypertension     Ill-defined condition     Tinnitus    Ill-defined condition 2011    Fractured Left ankle    Spinal stenosis          Prior to Admission Medications   Prescriptions Last Dose Informant Patient Reported? Taking?   baclofen (LIORESAL) 10 mg tablet 3/23/2022 at Unknown time  Yes Yes   Sig: Take 5 mg by mouth daily. co-enzyme Q-10 (CO Q-10) 100 mg capsule 3/17/2022 at Unknown time  Yes Yes   Sig: Take 200 mg by mouth daily (after dinner). cyclobenzaprine (FLEXERIL) 10 mg tablet   No No   Sig: Take 1 Tablet by mouth three (3) times daily as needed for Muscle Spasm(s). Patient not taking: Reported on 9/3/2021   escitalopram oxalate (LEXAPRO) 10 mg tablet 3/24/2022 at Unknown time  No Yes   Sig: TAKE 1 TABLET BY MOUTH DAILY   gabapentin (NEURONTIN) 300 mg capsule 3/23/2022 at Unknown time  Yes Yes   Sig: Take 300 mg by mouth two (2) times a day. hydroCHLOROthiazide (HYDRODIURIL) 12.5 mg tablet 3/24/2022 at Unknown time  No Yes   Sig: TAKE 1 TABLET BY MOUTH DAILY   lisinopriL (PRINIVIL, ZESTRIL) 40 mg tablet 3/24/2022 at Unknown time  No Yes   Sig: TAKE 1 TABLET BY MOUTH DAILY   lovastatin (MEVACOR) 20 mg tablet 3/23/2022 at Unknown time  No Yes   Sig: TAKE 1 TABLET BY MOUTH EVERY OTHER DAY   naproxen (NAPROSYN) 500 mg tablet 3/23/2022 at Unknown time  Yes Yes   Sig: Take 500 mg by mouth two (2) times daily (with meals).    pantoprazole (PROTONIX) 40 mg tablet 3/23/2022 at Unknown time  No Yes   Sig: TAKE 1 TABLET BY MOUTH DAILY Facility-Administered Medications: None       Chief complaint, history of present illness, and review of systems and Past medical History are positive for: Poole esophagus and GERD    The heart, lungs and mental status were satisfactory for the administration of sedation and for the procedure. I discussed with the patient the objectives, risks, consequences and alternatives to the procedure. The patient was counseled at length about the risks of fredi Covid-19 in the ilana-operative and post-operative states including the recovery window of their procedure. The patient was made aware that fredi Covid-19 after a surgical procedure may worsen their prognosis for recovering from the virus and lend to a higher morbidity and or mortality risk. The patient was given the options of postponing their procedure. All of the risks, benefits, and alternatives were discussed. The patient does  wish to proceed with the procedure.     Plan: Endoscopic procedure with sedation     Darion Terrell MD   3/24/2022  12:39 PM

## 2022-05-03 DIAGNOSIS — I10 ESSENTIAL HYPERTENSION: ICD-10-CM

## 2022-05-03 RX ORDER — HYDROCHLOROTHIAZIDE 12.5 MG/1
TABLET ORAL
Qty: 90 TABLET | Refills: 0 | Status: SHIPPED | OUTPATIENT
Start: 2022-05-03 | End: 2022-07-17

## 2022-05-09 DIAGNOSIS — I10 ESSENTIAL HYPERTENSION: ICD-10-CM

## 2022-05-09 DIAGNOSIS — F41.9 ANXIETY: ICD-10-CM

## 2022-05-09 DIAGNOSIS — K21.9 GASTROESOPHAGEAL REFLUX DISEASE WITHOUT ESOPHAGITIS: ICD-10-CM

## 2022-05-09 DIAGNOSIS — E78.49 OTHER HYPERLIPIDEMIA: ICD-10-CM

## 2022-05-09 RX ORDER — LISINOPRIL 40 MG/1
TABLET ORAL
Qty: 90 TABLET | Refills: 0 | Status: SHIPPED | OUTPATIENT
Start: 2022-05-09 | End: 2022-07-26

## 2022-05-09 RX ORDER — LOVASTATIN 20 MG/1
TABLET ORAL
Qty: 45 TABLET | Refills: 0 | Status: SHIPPED | OUTPATIENT
Start: 2022-05-09 | End: 2022-08-15

## 2022-05-09 RX ORDER — ESCITALOPRAM OXALATE 10 MG/1
TABLET ORAL
Qty: 90 TABLET | Refills: 0 | Status: SHIPPED | OUTPATIENT
Start: 2022-05-09 | End: 2022-08-15

## 2022-05-09 RX ORDER — PANTOPRAZOLE SODIUM 40 MG/1
40 TABLET, DELAYED RELEASE ORAL DAILY
Qty: 90 TABLET | Refills: 0 | Status: SHIPPED | OUTPATIENT
Start: 2022-05-09 | End: 2022-10-07

## 2022-07-17 DIAGNOSIS — I10 ESSENTIAL HYPERTENSION: ICD-10-CM

## 2022-07-17 RX ORDER — HYDROCHLOROTHIAZIDE 12.5 MG/1
TABLET ORAL
Qty: 90 TABLET | Refills: 0 | Status: SHIPPED | OUTPATIENT
Start: 2022-07-17 | End: 2022-10-05

## 2022-07-26 DIAGNOSIS — I10 ESSENTIAL HYPERTENSION: ICD-10-CM

## 2022-07-26 RX ORDER — LISINOPRIL 40 MG/1
TABLET ORAL
Qty: 90 TABLET | Refills: 0 | Status: SHIPPED | OUTPATIENT
Start: 2022-07-26 | End: 2022-10-23

## 2022-08-15 DIAGNOSIS — E78.49 OTHER HYPERLIPIDEMIA: ICD-10-CM

## 2022-08-15 DIAGNOSIS — F41.9 ANXIETY: ICD-10-CM

## 2022-08-15 RX ORDER — ESCITALOPRAM OXALATE 10 MG/1
TABLET ORAL
Qty: 90 TABLET | Refills: 0 | Status: SHIPPED | OUTPATIENT
Start: 2022-08-15 | End: 2022-08-16

## 2022-08-15 RX ORDER — LOVASTATIN 20 MG/1
TABLET ORAL
Qty: 45 TABLET | Refills: 0 | Status: SHIPPED | OUTPATIENT
Start: 2022-08-15 | End: 2022-11-03

## 2022-08-16 DIAGNOSIS — F41.9 ANXIETY: ICD-10-CM

## 2022-08-16 RX ORDER — ESCITALOPRAM OXALATE 10 MG/1
TABLET ORAL
Qty: 90 TABLET | Refills: 0 | Status: SHIPPED | OUTPATIENT
Start: 2022-08-16

## 2022-09-07 ENCOUNTER — OFFICE VISIT (OUTPATIENT)
Dept: PRIMARY CARE CLINIC | Age: 77
End: 2022-09-07
Payer: MEDICARE

## 2022-09-07 ENCOUNTER — NURSE TRIAGE (OUTPATIENT)
Dept: OTHER | Facility: CLINIC | Age: 77
End: 2022-09-07

## 2022-09-07 VITALS
WEIGHT: 169.2 LBS | BODY MASS INDEX: 31.95 KG/M2 | HEART RATE: 75 BPM | OXYGEN SATURATION: 97 % | RESPIRATION RATE: 18 BRPM | SYSTOLIC BLOOD PRESSURE: 116 MMHG | DIASTOLIC BLOOD PRESSURE: 73 MMHG | TEMPERATURE: 97.1 F | HEIGHT: 61 IN

## 2022-09-07 DIAGNOSIS — F41.9 ANXIETY: ICD-10-CM

## 2022-09-07 DIAGNOSIS — R53.83 OTHER FATIGUE: ICD-10-CM

## 2022-09-07 DIAGNOSIS — M81.0 POSTMENOPAUSAL BONE LOSS: ICD-10-CM

## 2022-09-07 DIAGNOSIS — I10 ESSENTIAL HYPERTENSION: ICD-10-CM

## 2022-09-07 DIAGNOSIS — Z23 NEED FOR SHINGLES VACCINE: ICD-10-CM

## 2022-09-07 DIAGNOSIS — Z00.00 MEDICARE ANNUAL WELLNESS VISIT, SUBSEQUENT: Primary | ICD-10-CM

## 2022-09-07 DIAGNOSIS — Z23 NEEDS FLU SHOT: ICD-10-CM

## 2022-09-07 DIAGNOSIS — E78.2 MIXED HYPERLIPIDEMIA: ICD-10-CM

## 2022-09-07 DIAGNOSIS — R26.89 BALANCE PROBLEM: ICD-10-CM

## 2022-09-07 DIAGNOSIS — M48.061 DEGENERATIVE LUMBAR SPINAL STENOSIS: ICD-10-CM

## 2022-09-07 DIAGNOSIS — H61.23 BILATERAL IMPACTED CERUMEN: ICD-10-CM

## 2022-09-07 PROCEDURE — 1090F PRES/ABSN URINE INCON ASSESS: CPT | Performed by: INTERNAL MEDICINE

## 2022-09-07 PROCEDURE — G0439 PPPS, SUBSEQ VISIT: HCPCS | Performed by: INTERNAL MEDICINE

## 2022-09-07 PROCEDURE — 90694 VACC AIIV4 NO PRSRV 0.5ML IM: CPT | Performed by: INTERNAL MEDICINE

## 2022-09-07 PROCEDURE — 1101F PT FALLS ASSESS-DOCD LE1/YR: CPT | Performed by: INTERNAL MEDICINE

## 2022-09-07 PROCEDURE — G8427 DOCREV CUR MEDS BY ELIG CLIN: HCPCS | Performed by: INTERNAL MEDICINE

## 2022-09-07 PROCEDURE — 99214 OFFICE O/P EST MOD 30 MIN: CPT | Performed by: INTERNAL MEDICINE

## 2022-09-07 PROCEDURE — G0008 ADMIN INFLUENZA VIRUS VAC: HCPCS | Performed by: INTERNAL MEDICINE

## 2022-09-07 PROCEDURE — G8419 CALC BMI OUT NRM PARAM NOF/U: HCPCS | Performed by: INTERNAL MEDICINE

## 2022-09-07 PROCEDURE — G8754 DIAS BP LESS 90: HCPCS | Performed by: INTERNAL MEDICINE

## 2022-09-07 PROCEDURE — G8536 NO DOC ELDER MAL SCRN: HCPCS | Performed by: INTERNAL MEDICINE

## 2022-09-07 PROCEDURE — 1123F ACP DISCUSS/DSCN MKR DOCD: CPT | Performed by: INTERNAL MEDICINE

## 2022-09-07 PROCEDURE — 69210 REMOVE IMPACTED EAR WAX UNI: CPT | Performed by: INTERNAL MEDICINE

## 2022-09-07 PROCEDURE — G8432 DEP SCR NOT DOC, RNG: HCPCS | Performed by: INTERNAL MEDICINE

## 2022-09-07 PROCEDURE — G8752 SYS BP LESS 140: HCPCS | Performed by: INTERNAL MEDICINE

## 2022-09-07 RX ORDER — ZOSTER VACCINE RECOMBINANT, ADJUVANTED 50 MCG/0.5
0.5 KIT INTRAMUSCULAR ONCE
Qty: 0.5 ML | Refills: 0 | Status: SHIPPED | OUTPATIENT
Start: 2022-09-07 | End: 2022-09-07

## 2022-09-07 NOTE — TELEPHONE ENCOUNTER
Received call from Anabela Mckeon at [facility] with [employer]. Subjective: Caller states \"Wanting a physical done. Last week had some bad fatigue where I was in bed and felt unsteady, but the fatigue is better. I had to use my walker. \"     Current Symptoms: Tired, Fatigue; recently dieting     Onset: 1 week ago; improving    Associated Symptoms: NA    Pain Severity: 0/10; none; Temperature:  Denies Fever    What has been tried: Nothing     LMP: NA Pregnant: NA    Recommended disposition: See PCP within 3 Days    Care advice provided, patient verbalizes understanding; denies any other questions or concerns; instructed to call back for any new or worsening symptoms. Patient/Caller agrees with recommended disposition; writer provided warm transfer to [facility] at [facility] for appointment scheduling    Attention Provider: Thank you for allowing me to participate in the care of your patient. The patient was connected to triage in response to information provided to the [facility]. Please do not respond through this encounter as the response is not directed to a shared pool.       Reason for Disposition   Fatigue (i.e., tires easily, decreased energy) and persists > 1 week    Protocols used: Weakness (Generalized) and Fatigue-ADULT-OH

## 2022-09-07 NOTE — PROGRESS NOTES
1. \"Have you been to the ER, urgent care clinic since your last visit? Hospitalized since your last visit? \" No    2. \"Have you seen or consulted any other health care providers outside of the [address] [place] [address] since your last visit? \" No     3. For patients aged 39-70: Has the patient had a colonoscopy / FIT/ Cologuard? NA - based on age      If the patient is female:    4. For patients aged 41-77: Has the patient had a mammogram within the past 2 years? Yes - no Care Gap present      5. For patients aged 21-65: Has the patient had a pap smear? NA - based on age or sex     Chief Complaint   Patient presents with    Fatigue     Unbalanced, has been using walker. Annual Wellness Visit             Serg Dejesus is a 69 y.o. female and presents for Annual Medicare Wellness Visit. Assessment of cognitive impairment: Alert and oriented x 3. Depression Screen:   3 most recent PHQ Screens 11/1/2021   Little interest or pleasure in doing things Not at all   Feeling down, depressed, irritable, or hopeless Not at all   Total Score PHQ 2 0   Trouble falling or staying asleep, or sleeping too much -   Feeling tired or having little energy -   Poor appetite, weight loss, or overeating -   Feeling bad about yourself - or that you are a failure or have let yourself or your family down -   Trouble concentrating on things such as school, work, reading, or watching TV -   Moving or speaking so slowly that other people could have noticed; or the opposite being so fidgety that others notice -   Thoughts of being better off dead, or hurting yourself in some way -   PHQ 9 Score -       Fall Risk Assessment:    Fall Risk Assessment, last 12 mths 9/3/2021   Able to walk? Yes   Fall in past 12 months? 0   Do you feel unsteady? 0   Are you worried about falling 0   Is TUG test greater than 12 seconds? -   Is the gait abnormal? -   Number of falls in past 12 months -       . Elo Hernández   Abuse Screening Questionnaire 9/7/2022   Do you ever feel afraid of your partner? N   Are you in a relationship with someone who physically or mentally threatens you? N   Is it safe for you to go home? Y      Activities of Daily Living:  Self-care. Requires assistance with: no ADLs  Patient handle his/her own medications  Yes Use of pill box  Yes      ADL Assessment 9/7/2022   Feeding yourself No Help Needed   Getting from bed to chair No Help Needed   Getting dressed No Help Needed   Bathing or showering No Help Needed   Walk across the room (includes cane/walker) No Help Needed   Using the telphone No Help Needed   Taking your medications No Help Needed   Preparing meals No Help Needed   Managing money (expenses/bills) No Help Needed   Moderately strenuous housework (laundry) No Help Needed   Shopping for personal items (toiletries/medicines) No Help Needed   Shopping for groceries No Help Needed   Driving No Help Needed   Climbing a flight of stairs No Help Needed   Getting to places beyond walking distances No Help Needed      Health Maintenance:  Daily Aspirin: no   Bone Density: order placed. Glaucoma Screening: yes  Immunizations:    Tetanus: 2021. Influenza: up to date. Shingles: No. Script sent to the pharmacy   PPSV-23: up to date. Prevnar-13: unknown. CQVHI41: up to date    Cancer screening:    Cervical:n/a  due to the age. Breast: up to date. Colon: up to date. Alcohol Risk Screen:   On any occasion during the past 3 months, have you had more than 3 drinks(female) or 4 drinks (male) containing alcohol in one? No  Do you average more than 7 drinks (female) or 14 drinks (male) per week? No  Type and amount:None    Hearing Loss:  Hearing is good. Vision Loss:   Wears glasses, contact lenses, or have any other visual impairment  Yes    Adult Nutrition Screen:  No risk factors noted. Advance Care Planning:   End of Life Planning: has an advanced directive - a copy HAS NOT been provided.   Gaudencio Adams ACP-Facilitator appointment no      Medications/Allergies: Reviewed with patient  Prior to Admission medications    Medication Sig Start Date End Date Taking? Authorizing Provider   escitalopram oxalate (LEXAPRO) 10 mg tablet TAKE 1 TABLET BY MOUTH DAILY 8/16/22  Yes Renetta Beavers MD   lovastatin (MEVACOR) 20 mg tablet TAKE 1 TABLET BY MOUTH EVERY OTHER DAY 8/15/22  Yes Renetta Beavers MD   lisinopriL (PRINIVIL, ZESTRIL) 40 mg tablet TAKE 1 TABLET BY MOUTH DAILY 7/26/22  Yes Renetta Beavers MD   hydroCHLOROthiazide (HYDRODIURIL) 12.5 mg tablet TAKE 1 TABLET BY MOUTH DAILY 7/17/22  Yes Renetta Beavers MD   pantoprazole (PROTONIX) 40 mg tablet TAKE 1 TABLET BY MOUTH DAILY 5/9/22  Yes Renetta Beavers MD   baclofen (LIORESAL) 10 mg tablet Take 5 mg by mouth daily. Yes Provider, Historical   gabapentin (NEURONTIN) 300 mg capsule Take 300 mg by mouth two (2) times a day. Yes Provider, Historical   naproxen (NAPROSYN) 500 mg tablet Take 500 mg by mouth two (2) times daily (with meals). Yes Provider, Historical   cyclobenzaprine (FLEXERIL) 10 mg tablet Take 1 Tablet by mouth three (3) times daily as needed for Muscle Spasm(s). Patient not taking: No sig reported 6/16/21   Renetta Beavers MD   co-enzyme Q-10 (CO Q-10) 100 mg capsule Take 200 mg by mouth daily (after dinner). Patient not taking: Reported on 9/7/2022    Provider, Historical       Allergies   Allergen Reactions    Diphth,Pertus(Acell),Tetanus Rash    Clindamycin Diarrhea     \"It gave me C-Dif. \"    Statins-Hmg-Coa Reductase Inhibitors Other (comments)     \"It makes all of my joints ache. \"       Objective:  Visit Vitals  Temp 97.1 °F (36.2 °C) (Temporal)   Ht 5' 1\" (1.549 m)   Wt 169 lb 3.2 oz (76.7 kg)   BMI 31.97 kg/m²    Body mass index is 31.97 kg/m². Problem List: Reviewed with patient and discussed risk factors.     Patient Active Problem List   Diagnosis Code    Essential hypertension I10    Degenerative lumbar spinal stenosis M48.061    Mixed hyperlipidemia E78.2 PSH: Reviewed with patient  Past Surgical History:   Procedure Laterality Date    HX BREAST BIOPSY Left 2005? Benign    HX COLONOSCOPY      HX DILATION AND CURETTAGE      for uterine polyp    HX GI      hernia    HX HEENT      cataract     HX ORTHOPAEDIC Left 2011    ORIF Left ankle        SH: Reviewed with patient  Social History     Tobacco Use    Smoking status: Never    Smokeless tobacco: Never   Vaping Use    Vaping Use: Never used   Substance Use Topics    Alcohol use: No    Drug use: No       FH: Reviewed with patient  Family History   Problem Relation Age of Onset    Stroke Mother     Colon Cancer Father     Breast Cancer Paternal Grandmother        Current medical providers:    Patient Care Team:  Yanelis Sharma MD as PCP - General (Internal Medicine Physician)  Yanelis Sharma MD as PCP - [facility] Empaneled Provider  Umm Saenz MD (Cardiovascular Disease Physician)    Plan:    Diagnoses and all orders for this visit:    Medicare annual wellness visit, subsequent  . Age appropriate Health screening and immunization discussed with patient. Needs flu shot  -     INFLUENZA, FLUAD, (AGE 72 Y+), IM, PF, 0.5 ML    Need for shingles vaccine  -     varicella-zoster recombinant, PF, (Shingrix, PF,) 50 mcg/0.5 mL susr injection; 0.5 mL by IntraMUSCular route once for 1 dose., Normal, Disp-0.5 mL, R-0    Postmenopausal bone loss  -     DEXA BONE DENSITY STUDY AXIAL;  Future      Health Maintenance   Topic Date Due    Shingrix Vaccine Age 49> (1 of 2) Never done    Pneumococcal 65+ years (2 - PCV) 09/28/2021    COVID-19 Vaccine (4 - Booster for Pfizer series) 02/16/2022    Flu Vaccine (1) 09/01/2022    Medicare Yearly Exam  09/04/2022    DTaP/Tdap/Td series (2 - Td or Tdap) 12/29/2023 (Originally 8/1/2021)    Depression Screen  11/01/2022    Lipid Screen  11/01/2022    Hepatitis C Screening  Completed    Bone Densitometry (Dexa) Screening  Completed          Urinary/ Fecal Incontinence: No    Regular physical exercise: Bikes and goes to Physical therapy     Patient verbalized understanding of information presented. AVS and Medicare Part B Preventive Services Table printed and given to pt and reviewed. See table for findings under Recommendation and Scheduled. All of the patient's questions were answered.

## 2022-09-07 NOTE — PROGRESS NOTES
Polo Eason (: 1945) is a 69 y.o. female, established patient, here for evaluation of the following chief complaint(s):  Fatigue (Unbalanced, has been using walker.) and Annual Wellness Visit     Written by Kasia Beck, as dictated by Dr. Gina Damon MD.      ASSESSMENT/PLAN:  Below is the assessment and plan developed based on review of pertinent history, physical exam, labs, studies, and medications. 1. Essential hypertension  Recheck CMP and CBC. BP is well-controlled on current medication. No change to dosage at this time.  -     METABOLIC PANEL, COMPREHENSIVE; Future  -     CBC W/O DIFF; Future    2. Balance problem  Explained that impacted ear wax can also cause balance problem. 3. Other fatigue  Recheck TSH.   -     TSH 3RD GENERATION; Future    7. Degenerative lumbar spinal stenosis  Followed by Dr. Regine Aguirre (Ortho). Well controlled on naproxen 500 mg BID, gabapentin 300 mg BID, and baclofen 5 mg daily. Continue on current medication(s). 8. Anxiety  Continue on Lexapro 10 mg daily. 9. Mixed hyperlipidemia  Continue on lovastatin 20 mg as directed. Recheck lipid panel.   -     LIPID PANEL; Future    10. Bilateral impacted cerumen  Ceruminosis is noted. Wax is removed by syringing and manual debridement. Instructions for home care to prevent wax buildup are given. -     REMOVE IMPACTED EAR WAX      SUBJECTIVE/OBJECTIVE:      The pt presents today for a follow up and routine check up. She is not fasting today for blood work and will return on a later day. She is c/o fatigue and balance issue (used a walker last week). Denies vertigo. She notes ear fullness with wax. She notes that she has a sinus implant. Her BP is on the low side at 116/73. She has been checking her BP at home and notes they are normal. She is on lisinopril 40 mg daily and HCTZ 12.5 mg daily for HTN.      She was followed by Dr. Regine Aguirre (Ortho) 2 weeks ago for spinal stenosis of lumbar region. She had an epidural 1 year ago. She is on naproxen 500 mg BID, gabapentin 300 mg BID, and baclofen 5 mg daily. She had an endoscopy recently which was normal. Her last colonoscopy had a small polyp. Denies irregular BM. Patient Active Problem List   Diagnosis Code    Essential hypertension I10    Degenerative lumbar spinal stenosis M48.061    Mixed hyperlipidemia E78.2        Current Outpatient Medications on File Prior to Visit   Medication Sig Dispense Refill    escitalopram oxalate (LEXAPRO) 10 mg tablet TAKE 1 TABLET BY MOUTH DAILY 90 Tablet 0    lovastatin (MEVACOR) 20 mg tablet TAKE 1 TABLET BY MOUTH EVERY OTHER DAY 45 Tablet 0    lisinopriL (PRINIVIL, ZESTRIL) 40 mg tablet TAKE 1 TABLET BY MOUTH DAILY 90 Tablet 0    hydroCHLOROthiazide (HYDRODIURIL) 12.5 mg tablet TAKE 1 TABLET BY MOUTH DAILY 90 Tablet 0    pantoprazole (PROTONIX) 40 mg tablet TAKE 1 TABLET BY MOUTH DAILY 90 Tablet 0    baclofen (LIORESAL) 10 mg tablet Take 5 mg by mouth daily. gabapentin (NEURONTIN) 300 mg capsule Take 300 mg by mouth two (2) times a day. naproxen (NAPROSYN) 500 mg tablet Take 500 mg by mouth two (2) times daily (with meals). cyclobenzaprine (FLEXERIL) 10 mg tablet Take 1 Tablet by mouth three (3) times daily as needed for Muscle Spasm(s). (Patient not taking: No sig reported) 18 Tablet 0    co-enzyme Q-10 (CO Q-10) 100 mg capsule Take 200 mg by mouth daily (after dinner). (Patient not taking: Reported on 9/7/2022)       No current facility-administered medications on file prior to visit. Allergies   Allergen Reactions    Diphth,Pertus(Acell),Tetanus Rash    Clindamycin Diarrhea     \"It gave me C-Dif. \"    Statins-Hmg-Coa Reductase Inhibitors Other (comments)     \"It makes all of my joints ache. \"       Past Medical History:   Diagnosis Date    Adverse effect of anesthesia     Slow to awake from Anesthesia    Asthma     Difficult intubation 12/11/15    required glidescope and bougie GERD (gastroesophageal reflux disease)     Poole's    Hypertension     Ill-defined condition     Tinnitus    Ill-defined condition 2011    Fractured Left ankle    Spinal stenosis        Past Surgical History:   Procedure Laterality Date    HX BREAST BIOPSY Left 2005? Benign    HX COLONOSCOPY      HX DILATION AND CURETTAGE      for uterine polyp    HX GI      hernia    HX HEENT      cataract     HX ORTHOPAEDIC Left 2011    ORIF Left ankle       Family History   Problem Relation Age of Onset    Stroke Mother     Colon Cancer Father     Breast Cancer Paternal Grandmother        Social History     Socioeconomic History    Marital status:      Spouse name: Not on file    Number of children: Not on file    Years of education: Not on file    Highest education level: Not on file   Occupational History    Not on file   Tobacco Use    Smoking status: Never    Smokeless tobacco: Never   Vaping Use    Vaping Use: Never used   Substance and Sexual Activity    Alcohol use: No    Drug use: No    Sexual activity: Not Currently   Other Topics Concern    Not on file   Social History Narrative    Not on file     No visits with results within 3 Month(s) from this visit. Latest known visit with results is:   Hospital Outpatient Visit on 03/21/2022   Component Date Value Ref Range Status    Specimen source 03/21/2022 Nasopharyngeal    Final    SARS-CoV-2 03/21/2022 Not detected  NOTD   Final    Comment:      The specimen is NEGATIVE for SARS-CoV-2, the novel coronavirus associated with COVID-19. A negative result does not rule out COVID-19. Charito SARS-CoV-2 for use on the Char[employer] Pure[employer]ense0/8800 Systems is a real-time RT-PCR test intended for the qualitative detection of nucleic acids from SARS-CoV-2  in clinician-collected nasal, nasopharyngeal,and oropharyngeal swab specimens from individuals who meet COVID-19 clinical and/or epidemiological criteria.   Charito SARS-CoV-2 is for use only under Emergency Use Authorization (EUA) in laboratories certified under [address] (CLIA), 42 [place]. 816W, that meet requirements to perform high or moderate complexity tests. An individual without symptoms of COVID-19 and who is not shedding SARS-CoV-2 virus would expect to have a negative (not detected) result in this assay. Fact sheet for Healthcare Providers: freshbag.co.nz  Fact sheet for Patients: http://www.ProudOnTV/                           72290/download       Methodology: RT-PCR        Review of Systems   Constitutional:  Positive for fatigue. Negative for activity change and unexpected weight change. HENT:  Negative for congestion, hearing loss, rhinorrhea and sore throat. Eyes:  Negative for discharge. Respiratory:  Negative for cough, chest tightness and shortness of breath. Cardiovascular:  Negative for leg swelling. Gastrointestinal:  Negative for abdominal pain, constipation and diarrhea. Genitourinary:  Negative for dysuria, flank pain, frequency and urgency. Musculoskeletal:  Positive for gait problem. Negative for arthralgias, back pain and myalgias. Skin:  Negative for color change and rash. Neurological:  Negative for dizziness, light-headedness and headaches. Psychiatric/Behavioral:  Negative for dysphoric mood and sleep disturbance. The patient is not nervous/anxious. Visit Vitals  /73 (BP 1 Location: Left upper arm, BP Patient Position: Sitting)   Pulse 75   Temp 97.1 °F (36.2 °C) (Temporal)   Resp 18   Ht 5' 1\" (1.549 m)   Wt 169 lb 3.2 oz (76.7 kg)   SpO2 97%   BMI 31.97 kg/m²      Physical Exam  Vitals and nursing note reviewed. Constitutional:       General: She is not in acute distress. Appearance: Normal appearance. She is well-developed. She is not diaphoretic. HENT:      Right Ear: External ear normal.      Left Ear: External ear normal.   Eyes:      General: No scleral icterus.         Right eye: No discharge. Left eye: No discharge. Extraocular Movements: Extraocular movements intact. Conjunctiva/sclera: Conjunctivae normal.   Cardiovascular:      Rate and Rhythm: Normal rate and regular rhythm. Pulmonary:      Effort: Pulmonary effort is normal.      Breath sounds: Normal breath sounds. No wheezing. Abdominal:      General: Bowel sounds are normal.      Palpations: Abdomen is soft. Tenderness: There is no abdominal tenderness. Musculoskeletal:      Cervical back: Normal range of motion and neck supple. Lymphadenopathy:      Cervical: No cervical adenopathy. Neurological:      Mental Status: She is alert and oriented to person, place, and time. Psychiatric:         Mood and Affect: Mood and affect normal.       An electronic signature was used to authenticate this note.   -- Ellie Merchant

## 2022-09-14 ENCOUNTER — HOSPITAL ENCOUNTER (OUTPATIENT)
Dept: MAMMOGRAPHY | Age: 77
Discharge: HOME | End: 2022-09-14
Attending: INTERNAL MEDICINE
Payer: MEDICARE

## 2022-09-14 DIAGNOSIS — M81.0 POSTMENOPAUSAL BONE LOSS: ICD-10-CM

## 2022-09-14 PROCEDURE — 77080 DXA BONE DENSITY AXIAL: CPT

## 2022-09-18 NOTE — PROGRESS NOTES
Results reviewed. Release via [address], your Bone density scan in back and showed Osteopenia. I would suggest weight lifting exercises 2-3 times a week  , vitamin D 1000 I.U daily dose and calcium 500 mg 3-4  times a week.

## 2022-10-05 DIAGNOSIS — I10 ESSENTIAL HYPERTENSION: ICD-10-CM

## 2022-10-05 RX ORDER — HYDROCHLOROTHIAZIDE 12.5 MG/1
TABLET ORAL
Qty: 90 TABLET | Refills: 0 | Status: SHIPPED | OUTPATIENT
Start: 2022-10-05

## 2022-10-07 DIAGNOSIS — K21.9 GASTROESOPHAGEAL REFLUX DISEASE WITHOUT ESOPHAGITIS: ICD-10-CM

## 2022-10-07 RX ORDER — PANTOPRAZOLE SODIUM 40 MG/1
40 TABLET, DELAYED RELEASE ORAL DAILY
Qty: 90 TABLET | Refills: 0 | Status: SHIPPED | OUTPATIENT
Start: 2022-10-07

## 2022-10-21 DIAGNOSIS — I10 ESSENTIAL HYPERTENSION: ICD-10-CM

## 2022-10-23 RX ORDER — LISINOPRIL 40 MG/1
TABLET ORAL
Qty: 90 TABLET | Refills: 0 | Status: SHIPPED | OUTPATIENT
Start: 2022-10-23

## 2022-11-03 DIAGNOSIS — E78.49 OTHER HYPERLIPIDEMIA: ICD-10-CM

## 2022-11-03 RX ORDER — LOVASTATIN 20 MG/1
TABLET ORAL
Qty: 45 TABLET | Refills: 0 | Status: SHIPPED | OUTPATIENT
Start: 2022-11-03

## 2022-12-24 DIAGNOSIS — I10 ESSENTIAL HYPERTENSION: ICD-10-CM

## 2022-12-29 RX ORDER — HYDROCHLOROTHIAZIDE 12.5 MG/1
TABLET ORAL
Qty: 90 TABLET | Refills: 0 | Status: SHIPPED | OUTPATIENT
Start: 2022-12-29

## 2023-01-06 DIAGNOSIS — F41.9 ANXIETY: ICD-10-CM

## 2023-01-06 RX ORDER — ESCITALOPRAM OXALATE 10 MG/1
TABLET ORAL
Qty: 90 TABLET | Refills: 0 | Status: SHIPPED | OUTPATIENT
Start: 2023-01-06

## 2023-01-12 DIAGNOSIS — K21.9 GASTROESOPHAGEAL REFLUX DISEASE WITHOUT ESOPHAGITIS: ICD-10-CM

## 2023-01-12 DIAGNOSIS — I10 ESSENTIAL HYPERTENSION: ICD-10-CM

## 2023-01-12 RX ORDER — LISINOPRIL 40 MG/1
TABLET ORAL
Qty: 90 TABLET | Refills: 0 | Status: SHIPPED | OUTPATIENT
Start: 2023-01-12

## 2023-01-12 RX ORDER — PANTOPRAZOLE SODIUM 40 MG/1
40 TABLET, DELAYED RELEASE ORAL DAILY
Qty: 90 TABLET | Refills: 0 | Status: SHIPPED | OUTPATIENT
Start: 2023-01-12

## 2023-01-23 DIAGNOSIS — E78.49 OTHER HYPERLIPIDEMIA: ICD-10-CM

## 2023-01-23 RX ORDER — LOVASTATIN 20 MG/1
TABLET ORAL
Qty: 45 TABLET | Refills: 0 | Status: SHIPPED | OUTPATIENT
Start: 2023-01-23

## 2023-02-12 DIAGNOSIS — E78.49 OTHER HYPERLIPIDEMIA: ICD-10-CM

## 2023-02-12 RX ORDER — LOVASTATIN 20 MG/1
TABLET ORAL
Qty: 45 TABLET | Refills: 0 | Status: SHIPPED | OUTPATIENT
Start: 2023-02-12

## 2023-03-24 DIAGNOSIS — I10 ESSENTIAL HYPERTENSION: ICD-10-CM

## 2023-03-24 RX ORDER — HYDROCHLOROTHIAZIDE 12.5 MG/1
TABLET ORAL
Qty: 90 TABLET | Refills: 0 | Status: SHIPPED | OUTPATIENT
Start: 2023-03-24

## 2023-04-02 DIAGNOSIS — I10 ESSENTIAL HYPERTENSION: ICD-10-CM

## 2023-04-02 DIAGNOSIS — K21.9 GASTROESOPHAGEAL REFLUX DISEASE WITHOUT ESOPHAGITIS: ICD-10-CM

## 2023-04-02 RX ORDER — PANTOPRAZOLE SODIUM 40 MG/1
40 TABLET, DELAYED RELEASE ORAL DAILY
Qty: 90 TABLET | Refills: 0 | Status: SHIPPED | OUTPATIENT
Start: 2023-04-02

## 2023-04-02 RX ORDER — LISINOPRIL 40 MG/1
TABLET ORAL
Qty: 90 TABLET | Refills: 0 | Status: SHIPPED | OUTPATIENT
Start: 2023-04-02

## 2023-05-26 RX ORDER — HYDROCHLOROTHIAZIDE 12.5 MG/1
1 TABLET ORAL DAILY
COMMUNITY
Start: 2023-03-24 | End: 2023-06-06

## 2023-05-26 RX ORDER — LOVASTATIN 20 MG/1
1 TABLET ORAL EVERY OTHER DAY
COMMUNITY
Start: 2023-02-12

## 2023-05-26 RX ORDER — GABAPENTIN 300 MG/1
300 CAPSULE ORAL 2 TIMES DAILY
COMMUNITY

## 2023-05-26 RX ORDER — PANTOPRAZOLE SODIUM 40 MG/1
40 TABLET, DELAYED RELEASE ORAL DAILY
COMMUNITY
Start: 2023-04-02 | End: 2023-07-06

## 2023-05-26 RX ORDER — BACLOFEN 10 MG/1
5 TABLET ORAL DAILY
COMMUNITY

## 2023-05-26 RX ORDER — LISINOPRIL 40 MG/1
1 TABLET ORAL DAILY
COMMUNITY
Start: 2023-04-02 | End: 2023-07-06

## 2023-05-26 RX ORDER — ESCITALOPRAM OXALATE 10 MG/1
1 TABLET ORAL DAILY
COMMUNITY
Start: 2023-04-07 | End: 2023-07-06

## 2023-05-26 RX ORDER — UBIDECARENONE 100 MG
200 CAPSULE ORAL
COMMUNITY

## 2023-05-26 RX ORDER — NAPROXEN 500 MG/1
500 TABLET ORAL 2 TIMES DAILY WITH MEALS
COMMUNITY

## 2023-06-06 RX ORDER — HYDROCHLOROTHIAZIDE 12.5 MG/1
TABLET ORAL DAILY
Qty: 90 TABLET | Refills: 0 | Status: SHIPPED | OUTPATIENT
Start: 2023-06-06

## 2023-07-06 RX ORDER — PANTOPRAZOLE SODIUM 40 MG/1
40 TABLET, DELAYED RELEASE ORAL DAILY
Qty: 90 TABLET | Refills: 0 | Status: SHIPPED | OUTPATIENT
Start: 2023-07-06

## 2023-07-06 RX ORDER — ESCITALOPRAM OXALATE 10 MG/1
TABLET ORAL DAILY
Qty: 90 TABLET | Refills: 0 | Status: SHIPPED | OUTPATIENT
Start: 2023-07-06

## 2023-07-06 RX ORDER — LISINOPRIL 40 MG/1
TABLET ORAL DAILY
Qty: 90 TABLET | Refills: 0 | Status: SHIPPED | OUTPATIENT
Start: 2023-07-06

## 2023-07-31 DIAGNOSIS — E78.2 MIXED HYPERLIPIDEMIA: Primary | ICD-10-CM

## 2023-07-31 RX ORDER — LOVASTATIN 20 MG/1
TABLET ORAL EVERY OTHER DAY
Qty: 45 TABLET | Refills: 0 | Status: SHIPPED | OUTPATIENT
Start: 2023-07-31

## 2023-09-02 RX ORDER — HYDROCHLOROTHIAZIDE 12.5 MG/1
TABLET ORAL DAILY
Qty: 90 TABLET | Refills: 0 | Status: SHIPPED | OUTPATIENT
Start: 2023-09-02

## 2023-09-30 RX ORDER — ESCITALOPRAM OXALATE 10 MG/1
TABLET ORAL DAILY
Qty: 30 TABLET | Refills: 0 | Status: SHIPPED | OUTPATIENT
Start: 2023-09-30 | End: 2023-10-30

## 2023-09-30 RX ORDER — PANTOPRAZOLE SODIUM 40 MG/1
40 TABLET, DELAYED RELEASE ORAL DAILY
Qty: 30 TABLET | Refills: 0 | Status: SHIPPED | OUTPATIENT
Start: 2023-09-30 | End: 2023-10-01

## 2023-09-30 RX ORDER — LISINOPRIL 40 MG/1
TABLET ORAL DAILY
Qty: 30 TABLET | Refills: 0 | Status: SHIPPED | OUTPATIENT
Start: 2023-09-30 | End: 2023-10-01

## 2023-10-01 RX ORDER — LISINOPRIL 40 MG/1
TABLET ORAL DAILY
Qty: 30 TABLET | Refills: 0 | Status: SHIPPED | OUTPATIENT
Start: 2023-10-01 | End: 2023-10-24

## 2023-10-01 RX ORDER — PANTOPRAZOLE SODIUM 40 MG/1
40 TABLET, DELAYED RELEASE ORAL DAILY
Qty: 30 TABLET | Refills: 0 | Status: SHIPPED | OUTPATIENT
Start: 2023-10-01 | End: 2023-10-01

## 2023-10-01 RX ORDER — LISINOPRIL 40 MG/1
TABLET ORAL DAILY
Qty: 90 TABLET | OUTPATIENT
Start: 2023-10-01

## 2023-10-01 RX ORDER — PANTOPRAZOLE SODIUM 40 MG/1
40 TABLET, DELAYED RELEASE ORAL DAILY
Qty: 30 TABLET | Refills: 0 | Status: SHIPPED | OUTPATIENT
Start: 2023-10-01 | End: 2023-10-30

## 2023-10-02 RX ORDER — PANTOPRAZOLE SODIUM 40 MG/1
40 TABLET, DELAYED RELEASE ORAL DAILY
Qty: 90 TABLET | OUTPATIENT
Start: 2023-10-02

## 2023-10-30 RX ORDER — PANTOPRAZOLE SODIUM 40 MG/1
40 TABLET, DELAYED RELEASE ORAL DAILY
Qty: 90 TABLET | OUTPATIENT
Start: 2023-10-30

## 2023-10-30 RX ORDER — ESCITALOPRAM OXALATE 10 MG/1
TABLET ORAL DAILY
Qty: 30 TABLET | Refills: 0 | Status: SHIPPED | OUTPATIENT
Start: 2023-10-30 | End: 2023-12-06

## 2023-10-30 RX ORDER — PANTOPRAZOLE SODIUM 40 MG/1
40 TABLET, DELAYED RELEASE ORAL DAILY
Qty: 30 TABLET | Refills: 0 | Status: SHIPPED | OUTPATIENT
Start: 2023-10-30 | End: 2023-12-06

## 2023-11-21 DIAGNOSIS — I10 ESSENTIAL HYPERTENSION: ICD-10-CM

## 2023-11-21 RX ORDER — LISINOPRIL 40 MG/1
TABLET ORAL DAILY
Qty: 30 TABLET | Refills: 0 | Status: SHIPPED | OUTPATIENT
Start: 2023-11-21 | End: 2023-12-12

## 2023-11-21 RX ORDER — LISINOPRIL 40 MG/1
TABLET ORAL DAILY
Qty: 90 TABLET | OUTPATIENT
Start: 2023-11-21

## 2023-12-04 ENCOUNTER — HOSPITAL ENCOUNTER (OUTPATIENT)
Age: 78
Discharge: HOME | End: 2023-12-07
Payer: MEDICARE

## 2023-12-04 DIAGNOSIS — M47.816 LUMBAR SPONDYLOSIS: ICD-10-CM

## 2023-12-04 DIAGNOSIS — M48.061 DEGENERATIVE LUMBAR SPINAL STENOSIS: ICD-10-CM

## 2023-12-04 DIAGNOSIS — M54.16 RADICULOPATHY OF LUMBAR REGION: ICD-10-CM

## 2023-12-04 DIAGNOSIS — M43.16 SPONDYLOLISTHESIS OF LUMBAR REGION: ICD-10-CM

## 2023-12-04 PROCEDURE — 72148 MRI LUMBAR SPINE W/O DYE: CPT

## 2023-12-06 DIAGNOSIS — F41.9 ANXIETY DISORDER, UNSPECIFIED TYPE: Primary | ICD-10-CM

## 2023-12-06 RX ORDER — ESCITALOPRAM OXALATE 10 MG/1
TABLET ORAL DAILY
Qty: 30 TABLET | Refills: 0 | Status: SHIPPED | OUTPATIENT
Start: 2023-12-06

## 2023-12-06 RX ORDER — PANTOPRAZOLE SODIUM 40 MG/1
40 TABLET, DELAYED RELEASE ORAL DAILY
Qty: 90 TABLET | OUTPATIENT
Start: 2023-12-06

## 2023-12-06 RX ORDER — PANTOPRAZOLE SODIUM 40 MG/1
40 TABLET, DELAYED RELEASE ORAL DAILY
Qty: 30 TABLET | Refills: 0 | Status: SHIPPED | OUTPATIENT
Start: 2023-12-06

## 2023-12-12 DIAGNOSIS — I10 ESSENTIAL HYPERTENSION: ICD-10-CM

## 2023-12-12 DIAGNOSIS — I10 ESSENTIAL HYPERTENSION: Primary | ICD-10-CM

## 2023-12-12 RX ORDER — LISINOPRIL 40 MG/1
TABLET ORAL DAILY
Qty: 30 TABLET | Refills: 0 | Status: SHIPPED | OUTPATIENT
Start: 2023-12-12

## 2023-12-12 RX ORDER — HYDROCHLOROTHIAZIDE 12.5 MG/1
TABLET ORAL DAILY
Qty: 30 TABLET | Refills: 0 | Status: SHIPPED | OUTPATIENT
Start: 2023-12-12

## 2023-12-12 RX ORDER — HYDROCHLOROTHIAZIDE 12.5 MG/1
TABLET ORAL DAILY
Qty: 90 TABLET | OUTPATIENT
Start: 2023-12-12

## 2023-12-13 DIAGNOSIS — I10 ESSENTIAL HYPERTENSION: ICD-10-CM

## 2023-12-13 RX ORDER — LISINOPRIL 40 MG/1
TABLET ORAL DAILY
Qty: 90 TABLET | OUTPATIENT
Start: 2023-12-13

## 2023-12-30 DIAGNOSIS — F41.9 ANXIETY DISORDER, UNSPECIFIED TYPE: ICD-10-CM

## 2023-12-30 DIAGNOSIS — I10 ESSENTIAL HYPERTENSION: ICD-10-CM

## 2023-12-31 RX ORDER — PANTOPRAZOLE SODIUM 40 MG/1
40 TABLET, DELAYED RELEASE ORAL DAILY
Qty: 30 TABLET | Refills: 0 | OUTPATIENT
Start: 2023-12-31

## 2023-12-31 RX ORDER — ESCITALOPRAM OXALATE 10 MG/1
TABLET ORAL DAILY
Qty: 30 TABLET | Refills: 0 | OUTPATIENT
Start: 2023-12-31

## 2023-12-31 RX ORDER — LISINOPRIL 40 MG/1
TABLET ORAL DAILY
Qty: 30 TABLET | Refills: 0 | OUTPATIENT
Start: 2023-12-31

## 2024-01-02 DIAGNOSIS — I10 ESSENTIAL HYPERTENSION: ICD-10-CM

## 2024-01-02 RX ORDER — HYDROCHLOROTHIAZIDE 12.5 MG/1
TABLET ORAL DAILY
Qty: 30 TABLET | Refills: 0 | OUTPATIENT
Start: 2024-01-02

## 2024-01-15 DIAGNOSIS — E78.2 MIXED HYPERLIPIDEMIA: ICD-10-CM

## 2024-01-15 RX ORDER — LOVASTATIN 20 MG/1
TABLET ORAL EVERY OTHER DAY
Qty: 45 TABLET | Refills: 0 | OUTPATIENT
Start: 2024-01-15

## 2024-02-04 DIAGNOSIS — F41.9 ANXIETY DISORDER, UNSPECIFIED TYPE: ICD-10-CM

## 2024-02-04 DIAGNOSIS — I10 ESSENTIAL HYPERTENSION: ICD-10-CM

## 2024-02-04 RX ORDER — ESCITALOPRAM OXALATE 10 MG/1
TABLET ORAL DAILY
Qty: 90 TABLET | OUTPATIENT
Start: 2024-02-04

## 2024-02-04 RX ORDER — PANTOPRAZOLE SODIUM 40 MG/1
40 TABLET, DELAYED RELEASE ORAL DAILY
Qty: 90 TABLET | OUTPATIENT
Start: 2024-02-04

## 2024-02-04 RX ORDER — HYDROCHLOROTHIAZIDE 12.5 MG/1
TABLET ORAL DAILY
Qty: 90 TABLET | OUTPATIENT
Start: 2024-02-04

## 2024-02-07 SDOH — HEALTH STABILITY: PHYSICAL HEALTH: ON AVERAGE, HOW MANY MINUTES DO YOU ENGAGE IN EXERCISE AT THIS LEVEL?: 30 MIN

## 2024-02-07 SDOH — ECONOMIC STABILITY: TRANSPORTATION INSECURITY
IN THE PAST 12 MONTHS, HAS LACK OF TRANSPORTATION KEPT YOU FROM MEETINGS, WORK, OR FROM GETTING THINGS NEEDED FOR DAILY LIVING?: NO

## 2024-02-07 SDOH — ECONOMIC STABILITY: FOOD INSECURITY: WITHIN THE PAST 12 MONTHS, YOU WORRIED THAT YOUR FOOD WOULD RUN OUT BEFORE YOU GOT MONEY TO BUY MORE.: NEVER TRUE

## 2024-02-07 SDOH — ECONOMIC STABILITY: INCOME INSECURITY: HOW HARD IS IT FOR YOU TO PAY FOR THE VERY BASICS LIKE FOOD, HOUSING, MEDICAL CARE, AND HEATING?: NOT HARD AT ALL

## 2024-02-07 SDOH — ECONOMIC STABILITY: FOOD INSECURITY: WITHIN THE PAST 12 MONTHS, THE FOOD YOU BOUGHT JUST DIDN'T LAST AND YOU DIDN'T HAVE MONEY TO GET MORE.: NEVER TRUE

## 2024-02-07 SDOH — ECONOMIC STABILITY: HOUSING INSECURITY
IN THE LAST 12 MONTHS, WAS THERE A TIME WHEN YOU DID NOT HAVE A STEADY PLACE TO SLEEP OR SLEPT IN A SHELTER (INCLUDING NOW)?: NO

## 2024-02-07 SDOH — HEALTH STABILITY: PHYSICAL HEALTH: ON AVERAGE, HOW MANY DAYS PER WEEK DO YOU ENGAGE IN MODERATE TO STRENUOUS EXERCISE (LIKE A BRISK WALK)?: 5 DAYS

## 2024-02-07 ASSESSMENT — PATIENT HEALTH QUESTIONNAIRE - PHQ9
SUM OF ALL RESPONSES TO PHQ QUESTIONS 1-9: 0
SUM OF ALL RESPONSES TO PHQ QUESTIONS 1-9: 0
1. LITTLE INTEREST OR PLEASURE IN DOING THINGS: 0
SUM OF ALL RESPONSES TO PHQ QUESTIONS 1-9: 0
SUM OF ALL RESPONSES TO PHQ QUESTIONS 1-9: 0
2. FEELING DOWN, DEPRESSED OR HOPELESS: 0
SUM OF ALL RESPONSES TO PHQ9 QUESTIONS 1 & 2: 0

## 2024-02-07 ASSESSMENT — LIFESTYLE VARIABLES
HOW MANY STANDARD DRINKS CONTAINING ALCOHOL DO YOU HAVE ON A TYPICAL DAY: PATIENT DOES NOT DRINK
HOW OFTEN DO YOU HAVE A DRINK CONTAINING ALCOHOL: 1
HOW MANY STANDARD DRINKS CONTAINING ALCOHOL DO YOU HAVE ON A TYPICAL DAY: 0
HOW OFTEN DO YOU HAVE A DRINK CONTAINING ALCOHOL: NEVER
HOW OFTEN DO YOU HAVE SIX OR MORE DRINKS ON ONE OCCASION: 1

## 2024-02-09 ENCOUNTER — OFFICE VISIT (OUTPATIENT)
Dept: PRIMARY CARE CLINIC | Facility: CLINIC | Age: 79
End: 2024-02-09
Payer: MEDICARE

## 2024-02-09 VITALS
HEIGHT: 61 IN | WEIGHT: 165.2 LBS | OXYGEN SATURATION: 96 % | RESPIRATION RATE: 16 BRPM | DIASTOLIC BLOOD PRESSURE: 86 MMHG | SYSTOLIC BLOOD PRESSURE: 132 MMHG | BODY MASS INDEX: 31.19 KG/M2 | TEMPERATURE: 97.5 F | HEART RATE: 67 BPM

## 2024-02-09 DIAGNOSIS — E66.9 OBESITY (BMI 30.0-34.9): ICD-10-CM

## 2024-02-09 DIAGNOSIS — Z00.00 MEDICARE ANNUAL WELLNESS VISIT, SUBSEQUENT: Primary | ICD-10-CM

## 2024-02-09 DIAGNOSIS — F41.8 ANXIETY ABOUT HEALTH: ICD-10-CM

## 2024-02-09 DIAGNOSIS — E78.2 MIXED HYPERLIPIDEMIA: ICD-10-CM

## 2024-02-09 DIAGNOSIS — I10 ESSENTIAL HYPERTENSION: ICD-10-CM

## 2024-02-09 DIAGNOSIS — K21.9 GASTROESOPHAGEAL REFLUX DISEASE WITHOUT ESOPHAGITIS: ICD-10-CM

## 2024-02-09 DIAGNOSIS — Z23 NEED FOR VACCINATION WITH 20-POLYVALENT PNEUMOCOCCAL CONJUGATE VACCINE: ICD-10-CM

## 2024-02-09 DIAGNOSIS — M48.061 DEGENERATIVE LUMBAR SPINAL STENOSIS: ICD-10-CM

## 2024-02-09 LAB
ALBUMIN SERPL-MCNC: 3.7 G/DL (ref 3.5–5)
ALBUMIN/GLOB SERPL: 1.2 (ref 1.1–2.2)
ALP SERPL-CCNC: 63 U/L (ref 45–117)
ALT SERPL-CCNC: 18 U/L (ref 12–78)
ANION GAP SERPL CALC-SCNC: 2 MMOL/L (ref 5–15)
AST SERPL-CCNC: 15 U/L (ref 15–37)
BILIRUB SERPL-MCNC: 0.7 MG/DL (ref 0.2–1)
BUN SERPL-MCNC: 28 MG/DL (ref 6–20)
BUN/CREAT SERPL: 35 (ref 12–20)
CALCIUM SERPL-MCNC: 9.3 MG/DL (ref 8.5–10.1)
CHLORIDE SERPL-SCNC: 106 MMOL/L (ref 97–108)
CHOLEST SERPL-MCNC: 218 MG/DL
CO2 SERPL-SCNC: 31 MMOL/L (ref 21–32)
CREAT SERPL-MCNC: 0.8 MG/DL (ref 0.55–1.02)
ERYTHROCYTE [DISTWIDTH] IN BLOOD BY AUTOMATED COUNT: 12.4 % (ref 11.5–14.5)
GLOBULIN SER CALC-MCNC: 3.2 G/DL (ref 2–4)
GLUCOSE SERPL-MCNC: 91 MG/DL (ref 65–100)
HCT VFR BLD AUTO: 43.4 % (ref 35–47)
HDLC SERPL-MCNC: 73 MG/DL
HDLC SERPL: 3 (ref 0–5)
HGB BLD-MCNC: 13.5 G/DL (ref 11.5–16)
LDLC SERPL CALC-MCNC: 130.2 MG/DL (ref 0–100)
MCH RBC QN AUTO: 29.4 PG (ref 26–34)
MCHC RBC AUTO-ENTMCNC: 31.1 G/DL (ref 30–36.5)
MCV RBC AUTO: 94.6 FL (ref 80–99)
NRBC # BLD: 0 K/UL (ref 0–0.01)
NRBC BLD-RTO: 0 PER 100 WBC
PLATELET # BLD AUTO: 215 K/UL (ref 150–400)
PMV BLD AUTO: 12.3 FL (ref 8.9–12.9)
POTASSIUM SERPL-SCNC: 4.2 MMOL/L (ref 3.5–5.1)
PROT SERPL-MCNC: 6.9 G/DL (ref 6.4–8.2)
RBC # BLD AUTO: 4.59 M/UL (ref 3.8–5.2)
SODIUM SERPL-SCNC: 139 MMOL/L (ref 136–145)
TRIGL SERPL-MCNC: 74 MG/DL
VLDLC SERPL CALC-MCNC: 14.8 MG/DL
WBC # BLD AUTO: 5.2 K/UL (ref 3.6–11)

## 2024-02-09 PROCEDURE — G8484 FLU IMMUNIZE NO ADMIN: HCPCS | Performed by: INTERNAL MEDICINE

## 2024-02-09 PROCEDURE — 1123F ACP DISCUSS/DSCN MKR DOCD: CPT | Performed by: INTERNAL MEDICINE

## 2024-02-09 PROCEDURE — 1090F PRES/ABSN URINE INCON ASSESS: CPT | Performed by: INTERNAL MEDICINE

## 2024-02-09 PROCEDURE — 1036F TOBACCO NON-USER: CPT | Performed by: INTERNAL MEDICINE

## 2024-02-09 PROCEDURE — 99214 OFFICE O/P EST MOD 30 MIN: CPT | Performed by: INTERNAL MEDICINE

## 2024-02-09 PROCEDURE — G0439 PPPS, SUBSEQ VISIT: HCPCS | Performed by: INTERNAL MEDICINE

## 2024-02-09 PROCEDURE — 3075F SYST BP GE 130 - 139MM HG: CPT | Performed by: INTERNAL MEDICINE

## 2024-02-09 PROCEDURE — G8417 CALC BMI ABV UP PARAM F/U: HCPCS | Performed by: INTERNAL MEDICINE

## 2024-02-09 PROCEDURE — G8399 PT W/DXA RESULTS DOCUMENT: HCPCS | Performed by: INTERNAL MEDICINE

## 2024-02-09 PROCEDURE — 3079F DIAST BP 80-89 MM HG: CPT | Performed by: INTERNAL MEDICINE

## 2024-02-09 PROCEDURE — G8427 DOCREV CUR MEDS BY ELIG CLIN: HCPCS | Performed by: INTERNAL MEDICINE

## 2024-02-09 RX ORDER — LISINOPRIL 40 MG/1
40 TABLET ORAL DAILY
Qty: 90 TABLET | Refills: 0 | Status: CANCELLED | OUTPATIENT
Start: 2024-02-09 | End: 2024-05-09

## 2024-02-09 RX ORDER — PREGABALIN 75 MG/1
CAPSULE ORAL
COMMUNITY
Start: 2024-02-01

## 2024-02-09 RX ORDER — LISINOPRIL 40 MG/1
40 TABLET ORAL DAILY
Qty: 90 TABLET | Refills: 1 | Status: SHIPPED | OUTPATIENT
Start: 2024-02-09

## 2024-02-09 RX ORDER — HYDROCHLOROTHIAZIDE 12.5 MG/1
12.5 TABLET ORAL DAILY
Qty: 90 TABLET | Refills: 0 | Status: CANCELLED | OUTPATIENT
Start: 2024-02-09 | End: 2024-05-09

## 2024-02-09 RX ORDER — PANTOPRAZOLE SODIUM 40 MG/1
40 TABLET, DELAYED RELEASE ORAL DAILY
Qty: 90 TABLET | Refills: 0 | Status: CANCELLED | OUTPATIENT
Start: 2024-02-09 | End: 2024-05-09

## 2024-02-09 RX ORDER — PANTOPRAZOLE SODIUM 20 MG/1
20 TABLET, DELAYED RELEASE ORAL
Qty: 90 TABLET | Refills: 1 | Status: SHIPPED | OUTPATIENT
Start: 2024-02-09

## 2024-02-09 RX ORDER — HYDROCHLOROTHIAZIDE 12.5 MG/1
12.5 TABLET ORAL DAILY
Qty: 90 TABLET | Refills: 0 | Status: SHIPPED | OUTPATIENT
Start: 2024-02-09 | End: 2024-05-09

## 2024-02-09 RX ORDER — ESCITALOPRAM OXALATE 5 MG/1
5 TABLET ORAL DAILY
Qty: 90 TABLET | Refills: 0 | Status: SHIPPED | OUTPATIENT
Start: 2024-02-09 | End: 2024-05-09

## 2024-02-09 ASSESSMENT — ENCOUNTER SYMPTOMS
COUGH: 0
ABDOMINAL PAIN: 0
BACK PAIN: 1
EYE DISCHARGE: 0
CHEST TIGHTNESS: 0
CONSTIPATION: 0
RHINORRHEA: 0
COLOR CHANGE: 0
SHORTNESS OF BREATH: 0
DIARRHEA: 0
SORE THROAT: 0

## 2024-02-09 NOTE — PROGRESS NOTES
Health Decision Maker has been checked with the patient   Primary Decision Maker: Joshua Sr - Son-in-Law - [phone]    Secondary Decision Maker: Nia Sr KHUSHBU - Child - [phone]    Supplemental (Other) Decision Maker: Cruzito Peoples - Child - [phone]     Patient has stated that the scribe can come in room    Chief Complaint   Patient presents with    Medicare AWV     Depression: Not at risk (2/7/2024)    PHQ-2     PHQ-2 Score: 0      BP (!) 148/91 (Site: Left Upper Arm)   Pulse 67   Temp 97.5 °F (36.4 °C)   Resp 16   Ht 1.549 m (5' 1\")   Wt 74.9 kg (165 lb 3.2 oz)   SpO2 96%   BMI 31.21 kg/m²     \"Have you been to the ER, urgent care clinic since your last visit?  Hospitalized since your last visit?\"    NO    “Have you seen or consulted any other health care providers outside of [facility] since your last visit?”    NO             
Yeny Peoples (:  1945) is a 79 y.o. female, Established patient, here for evaluation of the following chief complaint(s):  Medicare AWV           ASSESSMENT/PLAN:  1. Essential hypertension  -     CBC; Future  -     Comprehensive Metabolic Panel; Future  -     lisinopril (PRINIVIL;ZESTRIL) 40 MG tablet; Take 1 tablet by mouth daily, Disp-90 tablet, R-1Normal sent to pharmacy.   -     hydroCHLOROthiazide 12.5 MG tablet; Take 1 tablet by mouth daily, Disp-90 tablet, R-0Normal sent to pharmacy.   I ordered a CBC and CMP.   I recommend that she continue taking lisinopril 40 mg daily ad HCTZ -12.5 mg.     2. Degenerative lumbar spinal stenosis  She is followed by Orthopedics.    3. Mixed hyperlipidemia  -     Lipid Panel; Future  I ordered a lipid panel.  I recommend that she continue taking lovastatin 20 mg very other day.    4. Obesity (BMI 30.0-34.9)  I recommend that she have a low carbohydrate diet.    5. Anxiety about health  -     escitalopram (LEXAPRO) 5 MG tablet; Take 1 tablet by mouth daily, Disp-90 tablet, R-0Normal sent to pharmacy.  I decreased her Lexapro to 5 mg daily.    6. Gastroesophageal reflux disease without esophagitis  -     pantoprazole (PROTONIX) 20 MG tablet; Take 1 tablet by mouth every morning (before breakfast), Disp-90 tablet, R-1Normal sent to pharmacy.   I decreased her pantoprazole to 20 mg daily.      USPSTF recommendations discussed with patient.            Subjective   SUBJECTIVE/OBJECTIVE:  HPI    Patient presents today for a follow up for chronic conditions and a MWV.       She reports that she has been getting better from spinal stenosis pain.  She has received 2 injections and she is taking baclofen nightly, Lyrica nightly, and naproxen PRN.  She is followed by Orthopedics.   She is in PT which she states is helping.  She has not been able to walk regularly due to the pain.    She reports that she lost 16 lb but has gained much of it back.  She is in Weight 
(MEVACOR) 20 MG tablet TAKE 1 TABLET BY MOUTH EVERY OTHER DAY Yes Eufemia Ruffin MD   baclofen (LIORESAL) 10 MG tablet Take 0.5 tablets by mouth daily Yes Automatic Reconciliation, Ar   coenzyme Q10 100 MG CAPS capsule Take 2 capsules by mouth  Patient not taking: Reported on 2/9/2024  Automatic Reconciliation, Ar       CareTeam (Including outside providers/suppliers regularly involved in providing care):   Patient Care Team:  Eufemia Ruffin MD as PCP - General  Eufemia Ruffin MD as PCP - Empaneled Provider     Reviewed and updated this visit:  Tobacco  Allergies  Meds  Problems  Med Hx  Surg Hx  Soc Hx  Fam Hx       Reviewed and updated this visit:  Tobacco  Allergies  Meds  Problems  Med Hx  Surg Hx  Soc Hx  Fam Hx      Health screenings:   Eye exam up to date , had exam last week. [facility]   Mammogram does not want anymore   DEXA scan up to date   Colon cancer screening does not want any more   PAP smear N/a due to the age  COVID vaccine up to date ,does not want booster   Pneumonia vaccine  up to date , Prevnar dose sent to the pharmacy   Tdap up to date , declined due to the allergic reaction   Influenza  up to date   Shingles vaccine  up to date , does not want this year   Activity level can`t walk much due to back pain   Incontinence None   Controlled substance/Opioids none     All other Health screenings done under rooming encounter

## 2024-03-13 ENCOUNTER — TELEPHONE (OUTPATIENT)
Dept: PRIMARY CARE CLINIC | Facility: CLINIC | Age: 79
End: 2024-03-13

## 2024-03-13 NOTE — TELEPHONE ENCOUNTER
Patient reports that she tested positive for Covid  Little cough, and minor headache  Started roughly last Monday early Tuesday. Told patient to continue to monitor s/s and she can take Mucinex, Tylenol right now  Patient will continue to monitor and let us now if anything change

## 2024-03-13 NOTE — TELEPHONE ENCOUNTER
Patient tested positive for Covid today march 13th  she is asking for medication or an appointment. Please call to discuss options.

## 2024-04-02 DIAGNOSIS — E78.2 MIXED HYPERLIPIDEMIA: ICD-10-CM

## 2024-04-03 RX ORDER — LOVASTATIN 20 MG/1
TABLET ORAL EVERY OTHER DAY
Qty: 45 TABLET | Refills: 1 | Status: SHIPPED | OUTPATIENT
Start: 2024-04-03

## 2024-04-27 DIAGNOSIS — I10 ESSENTIAL HYPERTENSION: ICD-10-CM

## 2024-04-27 DIAGNOSIS — R45.89 ANXIETY ABOUT HEALTH: ICD-10-CM

## 2024-04-27 RX ORDER — HYDROCHLOROTHIAZIDE 12.5 MG/1
12.5 TABLET ORAL DAILY
Qty: 90 TABLET | Refills: 0 | Status: SHIPPED | OUTPATIENT
Start: 2024-04-27 | End: 2024-07-26

## 2024-04-27 RX ORDER — ESCITALOPRAM OXALATE 5 MG/1
5 TABLET ORAL DAILY
Qty: 90 TABLET | Refills: 0 | Status: SHIPPED | OUTPATIENT
Start: 2024-04-27 | End: 2024-07-26

## 2024-08-09 DIAGNOSIS — K21.9 GASTROESOPHAGEAL REFLUX DISEASE WITHOUT ESOPHAGITIS: ICD-10-CM

## 2024-08-09 RX ORDER — PANTOPRAZOLE SODIUM 20 MG/1
20 TABLET, DELAYED RELEASE ORAL
Qty: 90 TABLET | Refills: 1 | Status: SHIPPED | OUTPATIENT
Start: 2024-08-09

## 2024-09-01 DIAGNOSIS — I10 ESSENTIAL HYPERTENSION: ICD-10-CM

## 2024-09-02 RX ORDER — HYDROCHLOROTHIAZIDE 12.5 MG/1
12.5 TABLET ORAL DAILY
Qty: 90 TABLET | Refills: 0 | Status: SHIPPED | OUTPATIENT
Start: 2024-09-02 | End: 2024-12-01

## 2024-09-02 RX ORDER — LISINOPRIL 40 MG/1
40 TABLET ORAL DAILY
Qty: 90 TABLET | Refills: 1 | Status: SHIPPED | OUTPATIENT
Start: 2024-09-02

## 2024-09-22 DIAGNOSIS — R45.89 ANXIETY ABOUT HEALTH: ICD-10-CM

## 2024-09-22 DIAGNOSIS — E78.2 MIXED HYPERLIPIDEMIA: ICD-10-CM

## 2024-09-23 RX ORDER — LOVASTATIN 20 MG
TABLET ORAL EVERY OTHER DAY
Qty: 45 TABLET | Refills: 0 | Status: SHIPPED | OUTPATIENT
Start: 2024-09-23

## 2024-09-23 RX ORDER — ESCITALOPRAM OXALATE 5 MG/1
5 TABLET ORAL DAILY
Qty: 90 TABLET | Refills: 0 | Status: SHIPPED | OUTPATIENT
Start: 2024-09-23 | End: 2024-12-22

## 2024-11-03 DIAGNOSIS — R45.89 ANXIETY ABOUT HEALTH: ICD-10-CM

## 2024-11-03 DIAGNOSIS — I10 ESSENTIAL HYPERTENSION: ICD-10-CM

## 2024-11-03 RX ORDER — ESCITALOPRAM OXALATE 5 MG/1
5 TABLET ORAL DAILY
Qty: 90 TABLET | Refills: 0 | Status: SHIPPED | OUTPATIENT
Start: 2024-11-03 | End: 2025-02-01

## 2024-11-03 RX ORDER — HYDROCHLOROTHIAZIDE 12.5 MG/1
12.5 TABLET ORAL DAILY
Qty: 90 TABLET | Refills: 0 | Status: SHIPPED | OUTPATIENT
Start: 2024-11-03 | End: 2025-02-01

## 2024-11-04 DIAGNOSIS — K21.9 GASTROESOPHAGEAL REFLUX DISEASE WITHOUT ESOPHAGITIS: ICD-10-CM

## 2024-11-04 RX ORDER — PANTOPRAZOLE SODIUM 20 MG/1
20 TABLET, DELAYED RELEASE ORAL
Qty: 90 TABLET | Refills: 1 | OUTPATIENT
Start: 2024-11-04

## 2024-12-19 DIAGNOSIS — E78.2 MIXED HYPERLIPIDEMIA: ICD-10-CM

## 2024-12-19 RX ORDER — LOVASTATIN 20 MG/1
TABLET ORAL EVERY OTHER DAY
Qty: 45 TABLET | Refills: 0 | Status: SHIPPED | OUTPATIENT
Start: 2024-12-19

## 2024-12-23 DIAGNOSIS — E78.2 MIXED HYPERLIPIDEMIA: ICD-10-CM

## 2024-12-23 RX ORDER — LOVASTATIN 20 MG/1
TABLET ORAL EVERY OTHER DAY
Qty: 45 TABLET | Refills: 0 | OUTPATIENT
Start: 2024-12-23

## 2025-01-20 DIAGNOSIS — K21.9 GASTROESOPHAGEAL REFLUX DISEASE WITHOUT ESOPHAGITIS: ICD-10-CM

## 2025-01-20 RX ORDER — PANTOPRAZOLE SODIUM 20 MG/1
20 TABLET, DELAYED RELEASE ORAL
Qty: 90 TABLET | Refills: 0 | Status: SHIPPED | OUTPATIENT
Start: 2025-01-20

## 2025-01-22 DIAGNOSIS — K21.9 GASTROESOPHAGEAL REFLUX DISEASE WITHOUT ESOPHAGITIS: ICD-10-CM

## 2025-01-22 RX ORDER — PANTOPRAZOLE SODIUM 20 MG/1
20 TABLET, DELAYED RELEASE ORAL
Qty: 90 TABLET | Refills: 0 | OUTPATIENT
Start: 2025-01-22

## 2025-01-24 DIAGNOSIS — I10 ESSENTIAL HYPERTENSION: ICD-10-CM

## 2025-01-24 RX ORDER — LISINOPRIL 40 MG/1
40 TABLET ORAL DAILY
Qty: 90 TABLET | Refills: 0 | Status: SHIPPED | OUTPATIENT
Start: 2025-01-24

## 2025-01-27 DIAGNOSIS — I10 ESSENTIAL HYPERTENSION: ICD-10-CM

## 2025-01-27 RX ORDER — LISINOPRIL 40 MG/1
40 TABLET ORAL DAILY
Qty: 90 TABLET | Refills: 0 | OUTPATIENT
Start: 2025-01-27

## 2025-02-03 DIAGNOSIS — I10 ESSENTIAL HYPERTENSION: ICD-10-CM

## 2025-02-04 RX ORDER — HYDROCHLOROTHIAZIDE 12.5 MG/1
12.5 TABLET ORAL DAILY
Qty: 30 TABLET | Refills: 0 | Status: SHIPPED | OUTPATIENT
Start: 2025-02-04

## 2025-02-05 DIAGNOSIS — I10 ESSENTIAL HYPERTENSION: ICD-10-CM

## 2025-02-05 RX ORDER — HYDROCHLOROTHIAZIDE 12.5 MG/1
12.5 TABLET ORAL DAILY
Qty: 90 TABLET | OUTPATIENT
Start: 2025-02-05

## 2025-02-06 ENCOUNTER — PATIENT MESSAGE (OUTPATIENT)
Dept: PRIMARY CARE CLINIC | Facility: CLINIC | Age: 80
End: 2025-02-06

## 2025-03-02 DIAGNOSIS — I10 ESSENTIAL HYPERTENSION: ICD-10-CM

## 2025-03-02 RX ORDER — HYDROCHLOROTHIAZIDE 12.5 MG/1
12.5 TABLET ORAL DAILY
Qty: 30 TABLET | Refills: 0 | Status: SHIPPED | OUTPATIENT
Start: 2025-03-02

## 2025-03-03 DIAGNOSIS — I10 ESSENTIAL HYPERTENSION: ICD-10-CM

## 2025-03-03 RX ORDER — HYDROCHLOROTHIAZIDE 12.5 MG/1
12.5 TABLET ORAL DAILY
Qty: 90 TABLET | OUTPATIENT
Start: 2025-03-03

## 2025-03-25 DIAGNOSIS — E78.2 MIXED HYPERLIPIDEMIA: ICD-10-CM

## 2025-03-25 DIAGNOSIS — R45.89 ANXIETY ABOUT HEALTH: ICD-10-CM

## 2025-03-25 RX ORDER — ESCITALOPRAM OXALATE 5 MG/1
5 TABLET ORAL DAILY
Qty: 30 TABLET | Refills: 0 | Status: SHIPPED | OUTPATIENT
Start: 2025-03-25 | End: 2025-04-24

## 2025-03-25 RX ORDER — LOVASTATIN 20 MG/1
TABLET ORAL EVERY OTHER DAY
Qty: 45 TABLET | OUTPATIENT
Start: 2025-03-25

## 2025-03-25 RX ORDER — LOVASTATIN 20 MG/1
TABLET ORAL EVERY OTHER DAY
Qty: 15 TABLET | Refills: 0 | Status: SHIPPED | OUTPATIENT
Start: 2025-03-25

## 2025-03-30 DIAGNOSIS — I10 ESSENTIAL HYPERTENSION: ICD-10-CM

## 2025-03-30 RX ORDER — HYDROCHLOROTHIAZIDE 12.5 MG/1
12.5 TABLET ORAL DAILY
Qty: 30 TABLET | Refills: 2 | Status: SHIPPED | OUTPATIENT
Start: 2025-03-30

## 2025-04-20 DIAGNOSIS — R45.89 ANXIETY ABOUT HEALTH: ICD-10-CM

## 2025-04-20 DIAGNOSIS — E78.2 MIXED HYPERLIPIDEMIA: ICD-10-CM

## 2025-04-20 RX ORDER — LOVASTATIN 20 MG/1
TABLET ORAL EVERY OTHER DAY
Qty: 15 TABLET | Refills: 0 | Status: SHIPPED | OUTPATIENT
Start: 2025-04-20

## 2025-04-20 RX ORDER — ESCITALOPRAM OXALATE 5 MG/1
5 TABLET ORAL DAILY
Qty: 30 TABLET | Refills: 0 | Status: SHIPPED | OUTPATIENT
Start: 2025-04-20

## 2025-05-07 DIAGNOSIS — K21.9 GASTROESOPHAGEAL REFLUX DISEASE WITHOUT ESOPHAGITIS: ICD-10-CM

## 2025-05-07 RX ORDER — PANTOPRAZOLE SODIUM 20 MG/1
20 TABLET, DELAYED RELEASE ORAL
Qty: 90 TABLET | Refills: 0 | Status: SHIPPED | OUTPATIENT
Start: 2025-05-07

## 2025-05-07 NOTE — TELEPHONE ENCOUNTER
Patient is requesting refill on -  pantoprazole (PROTONIX) 20 MG tablet    And would like for it to be sent to -  [id number] - [place] - [address] - P [phone] - F [phone]

## 2025-05-16 DIAGNOSIS — E78.2 MIXED HYPERLIPIDEMIA: ICD-10-CM

## 2025-05-16 RX ORDER — LOVASTATIN 20 MG/1
TABLET ORAL EVERY OTHER DAY
Qty: 15 TABLET | Refills: 0 | OUTPATIENT
Start: 2025-05-16

## 2025-05-18 DIAGNOSIS — R45.89 ANXIETY ABOUT HEALTH: ICD-10-CM

## 2025-05-18 RX ORDER — ESCITALOPRAM OXALATE 5 MG/1
5 TABLET ORAL DAILY
Qty: 10 TABLET | Refills: 0 | Status: SHIPPED | OUTPATIENT
Start: 2025-05-18

## 2025-05-20 ENCOUNTER — TELEPHONE (OUTPATIENT)
Dept: PRIMARY CARE CLINIC | Facility: CLINIC | Age: 80
End: 2025-05-20

## 2025-05-20 SDOH — ECONOMIC STABILITY: TRANSPORTATION INSECURITY
IN THE PAST 12 MONTHS, HAS THE LACK OF TRANSPORTATION KEPT YOU FROM MEDICAL APPOINTMENTS OR FROM GETTING MEDICATIONS?: NO

## 2025-05-20 SDOH — ECONOMIC STABILITY: INCOME INSECURITY: IN THE LAST 12 MONTHS, WAS THERE A TIME WHEN YOU WERE NOT ABLE TO PAY THE MORTGAGE OR RENT ON TIME?: NO

## 2025-05-20 SDOH — ECONOMIC STABILITY: FOOD INSECURITY: WITHIN THE PAST 12 MONTHS, YOU WORRIED THAT YOUR FOOD WOULD RUN OUT BEFORE YOU GOT MONEY TO BUY MORE.: NEVER TRUE

## 2025-05-20 SDOH — ECONOMIC STABILITY: FOOD INSECURITY: WITHIN THE PAST 12 MONTHS, THE FOOD YOU BOUGHT JUST DIDN'T LAST AND YOU DIDN'T HAVE MONEY TO GET MORE.: NEVER TRUE

## 2025-05-20 SDOH — HEALTH STABILITY: PHYSICAL HEALTH: ON AVERAGE, HOW MANY MINUTES DO YOU ENGAGE IN EXERCISE AT THIS LEVEL?: 50 MIN

## 2025-05-20 SDOH — HEALTH STABILITY: PHYSICAL HEALTH: ON AVERAGE, HOW MANY DAYS PER WEEK DO YOU ENGAGE IN MODERATE TO STRENUOUS EXERCISE (LIKE A BRISK WALK)?: 7 DAYS

## 2025-05-20 ASSESSMENT — PATIENT HEALTH QUESTIONNAIRE - PHQ9
1. LITTLE INTEREST OR PLEASURE IN DOING THINGS: NOT AT ALL
SUM OF ALL RESPONSES TO PHQ QUESTIONS 1-9: 0
2. FEELING DOWN, DEPRESSED OR HOPELESS: NOT AT ALL
SUM OF ALL RESPONSES TO PHQ QUESTIONS 1-9: 0

## 2025-05-20 ASSESSMENT — LIFESTYLE VARIABLES
HOW OFTEN DO YOU HAVE SIX OR MORE DRINKS ON ONE OCCASION: 1
HOW MANY STANDARD DRINKS CONTAINING ALCOHOL DO YOU HAVE ON A TYPICAL DAY: PATIENT DOES NOT DRINK
HOW OFTEN DO YOU HAVE A DRINK CONTAINING ALCOHOL: 1
HOW OFTEN DO YOU HAVE A DRINK CONTAINING ALCOHOL: NEVER
HOW MANY STANDARD DRINKS CONTAINING ALCOHOL DO YOU HAVE ON A TYPICAL DAY: 0

## 2025-05-20 NOTE — TELEPHONE ENCOUNTER
Attempted to contact patient regarding upcoming Medicare wellness appointment and completion of HRA questionnaire. LVM for patient to please return call at  [phone], mcm sent as well.

## 2025-05-23 ENCOUNTER — OFFICE VISIT (OUTPATIENT)
Dept: PRIMARY CARE CLINIC | Facility: CLINIC | Age: 80
End: 2025-05-23
Payer: MEDICARE

## 2025-05-23 VITALS
DIASTOLIC BLOOD PRESSURE: 82 MMHG | OXYGEN SATURATION: 97 % | SYSTOLIC BLOOD PRESSURE: 152 MMHG | HEART RATE: 64 BPM | WEIGHT: 166 LBS | TEMPERATURE: 97.2 F | HEIGHT: 61 IN | BODY MASS INDEX: 31.34 KG/M2 | RESPIRATION RATE: 16 BRPM

## 2025-05-23 DIAGNOSIS — F41.9 ANXIETY: ICD-10-CM

## 2025-05-23 DIAGNOSIS — I10 PRIMARY HYPERTENSION: ICD-10-CM

## 2025-05-23 DIAGNOSIS — M48.061 DEGENERATIVE LUMBAR SPINAL STENOSIS: ICD-10-CM

## 2025-05-23 DIAGNOSIS — M85.89 OSTEOPENIA OF MULTIPLE SITES: ICD-10-CM

## 2025-05-23 DIAGNOSIS — K21.9 GASTROESOPHAGEAL REFLUX DISEASE WITHOUT ESOPHAGITIS: ICD-10-CM

## 2025-05-23 DIAGNOSIS — Z00.00 MEDICARE ANNUAL WELLNESS VISIT, SUBSEQUENT: Primary | ICD-10-CM

## 2025-05-23 DIAGNOSIS — E78.2 MIXED HYPERLIPIDEMIA: ICD-10-CM

## 2025-05-23 DIAGNOSIS — Z12.31 ENCOUNTER FOR SCREENING MAMMOGRAM FOR MALIGNANT NEOPLASM OF BREAST: ICD-10-CM

## 2025-05-23 DIAGNOSIS — Z23 NEED FOR SHINGLES VACCINE: ICD-10-CM

## 2025-05-23 PROCEDURE — 1090F PRES/ABSN URINE INCON ASSESS: CPT | Performed by: INTERNAL MEDICINE

## 2025-05-23 PROCEDURE — 1160F RVW MEDS BY RX/DR IN RCRD: CPT | Performed by: INTERNAL MEDICINE

## 2025-05-23 PROCEDURE — 1123F ACP DISCUSS/DSCN MKR DOCD: CPT | Performed by: INTERNAL MEDICINE

## 2025-05-23 PROCEDURE — 1159F MED LIST DOCD IN RCRD: CPT | Performed by: INTERNAL MEDICINE

## 2025-05-23 PROCEDURE — G8417 CALC BMI ABV UP PARAM F/U: HCPCS | Performed by: INTERNAL MEDICINE

## 2025-05-23 PROCEDURE — 1036F TOBACCO NON-USER: CPT | Performed by: INTERNAL MEDICINE

## 2025-05-23 PROCEDURE — G8427 DOCREV CUR MEDS BY ELIG CLIN: HCPCS | Performed by: INTERNAL MEDICINE

## 2025-05-23 PROCEDURE — G0439 PPPS, SUBSEQ VISIT: HCPCS | Performed by: INTERNAL MEDICINE

## 2025-05-23 PROCEDURE — 99214 OFFICE O/P EST MOD 30 MIN: CPT | Performed by: INTERNAL MEDICINE

## 2025-05-23 PROCEDURE — 3075F SYST BP GE 130 - 139MM HG: CPT | Performed by: INTERNAL MEDICINE

## 2025-05-23 PROCEDURE — 3079F DIAST BP 80-89 MM HG: CPT | Performed by: INTERNAL MEDICINE

## 2025-05-23 PROCEDURE — G8399 PT W/DXA RESULTS DOCUMENT: HCPCS | Performed by: INTERNAL MEDICINE

## 2025-05-23 RX ORDER — ESCITALOPRAM OXALATE 5 MG/1
5 TABLET ORAL DAILY
Qty: 90 TABLET | Refills: 1 | Status: SHIPPED | OUTPATIENT
Start: 2025-05-23

## 2025-05-23 RX ORDER — OMEPRAZOLE 10 MG/1
10 CAPSULE, DELAYED RELEASE ORAL DAILY
Qty: 90 CAPSULE | Refills: 1 | Status: SHIPPED | OUTPATIENT
Start: 2025-05-23

## 2025-05-23 RX ORDER — LISINOPRIL 40 MG/1
40 TABLET ORAL DAILY
Qty: 90 TABLET | Refills: 2 | Status: SHIPPED | OUTPATIENT
Start: 2025-05-23

## 2025-05-23 RX ORDER — ZOSTER VACCINE RECOMBINANT, ADJUVANTED 50 MCG/0.5
0.5 KIT INTRAMUSCULAR SEE ADMIN INSTRUCTIONS
Qty: 0.5 ML | Refills: 0 | Status: SHIPPED | OUTPATIENT
Start: 2025-05-23 | End: 2025-11-19

## 2025-05-23 SDOH — ECONOMIC STABILITY: FOOD INSECURITY: WITHIN THE PAST 12 MONTHS, YOU WORRIED THAT YOUR FOOD WOULD RUN OUT BEFORE YOU GOT MONEY TO BUY MORE.: NEVER TRUE

## 2025-05-23 SDOH — ECONOMIC STABILITY: FOOD INSECURITY: WITHIN THE PAST 12 MONTHS, THE FOOD YOU BOUGHT JUST DIDN'T LAST AND YOU DIDN'T HAVE MONEY TO GET MORE.: NEVER TRUE

## 2025-05-23 ASSESSMENT — ENCOUNTER SYMPTOMS
COLOR CHANGE: 0
RHINORRHEA: 0
SORE THROAT: 0
CONSTIPATION: 0
EYE DISCHARGE: 0
COUGH: 0
ABDOMINAL PAIN: 0
BACK PAIN: 0
SHORTNESS OF BREATH: 0
CHEST TIGHTNESS: 0
DIARRHEA: 0

## 2025-05-23 NOTE — PROGRESS NOTES
Health Decision Maker has been checked with the patient   Primary Decision Maker: Joshua Sr - Son-in-Law - [phone]    Secondary Decision Maker: Nia SriTn - Child - [phone]    Supplemental (Other) Decision Maker: Cruzito Peoples - Child - [phone]         Chief Complaint   Patient presents with    Medicare AWV     Patient's BP hasn't been stable. It has been low and high.    Medication Refill       \"Have you been to the ER, urgent care clinic since your last visit?  Hospitalized since your last visit?\"    NO    “Have you seen or consulted any other health care providers outside of [facility] since your last visit?”    NO      Vitals:    05/23/25 1308   Resp: 16   Temp: 97.2 °F (36.2 °C)   TempSrc: Temporal   Weight: 75.3 kg (166 lb)   Height: 1.549 m (5' 1\")                 Click Here for Release of Records Request  
Medicare Annual Wellness Visit    Yeny Peoples is here for Medicare AWV (Patient's BP hasn't been stable. It has been low and high.) and Medication Refill    Assessment & Plan   Medicare annual wellness visit, subsequent  .Age appropriate Health screening and immunization discussed with patient.    Need for shingles vaccine  -     zoster recombinant adjuvanted vaccine (SHINGRIX) 50 MCG/0.5ML SUSR injection; Inject 0.5 mLs into the muscle See Admin Instructions 1 dose now and repeat in 2-6 months, Disp-0.5 mL, R-0Normal       Subjective       Patient's complete Health Risk Assessment and screening values have been reviewed and are found in Flowsheets. The following problems were reviewed today and where indicated follow up appointments were made and/or referrals ordered.    Positive Risk Factor Screenings with Interventions:             General HRA Questions:  Select all that apply: (!) (Patient-Rptd) New or Increased Pain, Stress    Interventions - Pain:  Sees spine specialist for pain management   Interventions - Stress:  Patient comments: does not want to take any other medication for stress         Abnormal BMI (obese):  Body mass index is 31.37 kg/m². (!) Abnormal    Interventions:  low carbohydrate diet                           Objective   Vitals:    05/23/25 1308 05/23/25 1427   BP: 137/82 (!) 152/82   BP Site: Left Upper Arm Right Upper Arm   Patient Position: Sitting Sitting   Pulse: 64    Resp: 16    Temp: 97.2 °F (36.2 °C)    TempSrc: Temporal    SpO2: 97%    Weight: 75.3 kg (166 lb)    Height: 1.549 m (5' 1\")       Body mass index is 31.37 kg/m².                    Allergies   Allergen Reactions    Clindamycin Diarrhea     \"It gave me C-Dif.\"    Statins Other (See Comments)     \"It makes all of my joints ache.\"    Tetanus Toxoids Swelling     Prior to Visit Medications    Medication Sig Taking? Authorizing Provider   omeprazole (PRILOSEC) 10 MG delayed release capsule Take 1 capsule by mouth daily Yes 
authenticate this note.    --Norma Hurt

## 2025-05-24 LAB
ALBUMIN SERPL-MCNC: 3.8 G/DL (ref 3.5–5)
ALBUMIN/GLOB SERPL: 1 (ref 1.1–2.2)
ALP SERPL-CCNC: 72 U/L (ref 45–117)
ALT SERPL-CCNC: 20 U/L (ref 12–78)
ANION GAP SERPL CALC-SCNC: 6 MMOL/L (ref 2–12)
AST SERPL-CCNC: 17 U/L (ref 15–37)
BILIRUB SERPL-MCNC: 0.7 MG/DL (ref 0.2–1)
BUN SERPL-MCNC: 25 MG/DL (ref 6–20)
BUN/CREAT SERPL: 36 (ref 12–20)
CALCIUM SERPL-MCNC: 9.6 MG/DL (ref 8.5–10.1)
CHLORIDE SERPL-SCNC: 105 MMOL/L (ref 97–108)
CHOLEST SERPL-MCNC: 199 MG/DL
CO2 SERPL-SCNC: 29 MMOL/L (ref 21–32)
CREAT SERPL-MCNC: 0.69 MG/DL (ref 0.55–1.02)
ERYTHROCYTE [DISTWIDTH] IN BLOOD BY AUTOMATED COUNT: 12.9 % (ref 11.5–14.5)
GLOBULIN SER CALC-MCNC: 3.7 G/DL (ref 2–4)
GLUCOSE SERPL-MCNC: 92 MG/DL (ref 65–100)
HCT VFR BLD AUTO: 43.1 % (ref 35–47)
HDLC SERPL-MCNC: 68 MG/DL
HDLC SERPL: 2.9 (ref 0–5)
HGB BLD-MCNC: 13.9 G/DL (ref 11.5–16)
LDLC SERPL CALC-MCNC: 108.2 MG/DL (ref 0–100)
MCH RBC QN AUTO: 29.8 PG (ref 26–34)
MCHC RBC AUTO-ENTMCNC: 32.3 G/DL (ref 30–36.5)
MCV RBC AUTO: 92.5 FL (ref 80–99)
NRBC # BLD: 0 K/UL (ref 0–0.01)
NRBC BLD-RTO: 0 PER 100 WBC
PLATELET # BLD AUTO: 223 K/UL (ref 150–400)
PMV BLD AUTO: 12.6 FL (ref 8.9–12.9)
POTASSIUM SERPL-SCNC: 4 MMOL/L (ref 3.5–5.1)
PROT SERPL-MCNC: 7.5 G/DL (ref 6.4–8.2)
RBC # BLD AUTO: 4.66 M/UL (ref 3.8–5.2)
SODIUM SERPL-SCNC: 140 MMOL/L (ref 136–145)
TRIGL SERPL-MCNC: 114 MG/DL
VLDLC SERPL CALC-MCNC: 22.8 MG/DL
WBC # BLD AUTO: 5.4 K/UL (ref 3.6–11)

## 2025-05-28 ENCOUNTER — RESULTS FOLLOW-UP (OUTPATIENT)
Dept: PRIMARY CARE CLINIC | Facility: CLINIC | Age: 80
End: 2025-05-28

## 2025-06-03 DIAGNOSIS — E78.2 MIXED HYPERLIPIDEMIA: ICD-10-CM

## 2025-06-03 DIAGNOSIS — I10 ESSENTIAL HYPERTENSION: ICD-10-CM

## 2025-06-03 RX ORDER — LOVASTATIN 20 MG/1
20 TABLET ORAL EVERY OTHER DAY
Qty: 45 TABLET | Refills: 0 | Status: SHIPPED | OUTPATIENT
Start: 2025-06-03 | End: 2025-09-01

## 2025-06-03 RX ORDER — HYDROCHLOROTHIAZIDE 12.5 MG/1
12.5 TABLET ORAL DAILY
Qty: 90 TABLET | Refills: 0 | Status: SHIPPED | OUTPATIENT
Start: 2025-06-03 | End: 2025-09-01

## 2025-06-03 NOTE — TELEPHONE ENCOUNTER
Requested Prescriptions     Pending Prescriptions Disp Refills    hydroCHLOROthiazide 12.5 MG tablet [Pharmacy Med Name: HYDROCHLOROTHIAZIDE 12.5MG TABLETS] 30 tablet 2     Sig: TAKE 1 TABLET BY MOUTH DAILY    lovastatin (MEVACOR) 20 MG tablet [Pharmacy Med Name: LOVASTATIN 20MG TABLETS] 15 tablet 0     Sig: TAKE 1 TABLET BY MOUTH EVERY OTHER DAY        Last Visit 5/23/25  Last Refill   3/30/25  4/20/25

## 2025-06-27 ENCOUNTER — HOSPITAL ENCOUNTER (OUTPATIENT)
Facility: HOSPITAL | Age: 80
Discharge: HOME | End: 2025-06-30
Attending: INTERNAL MEDICINE
Payer: MEDICARE

## 2025-06-27 DIAGNOSIS — M85.89 OSTEOPENIA OF MULTIPLE SITES: ICD-10-CM

## 2025-06-27 DIAGNOSIS — Z12.31 ENCOUNTER FOR SCREENING MAMMOGRAM FOR MALIGNANT NEOPLASM OF BREAST: ICD-10-CM

## 2025-06-27 PROCEDURE — 77080 DXA BONE DENSITY AXIAL: CPT

## 2025-06-27 PROCEDURE — 77063 BREAST TOMOSYNTHESIS BI: CPT

## 2025-06-28 ENCOUNTER — RESULTS FOLLOW-UP (OUTPATIENT)
Dept: PRIMARY CARE CLINIC | Facility: CLINIC | Age: 80
End: 2025-06-28

## 2025-08-12 DIAGNOSIS — K21.9 GASTROESOPHAGEAL REFLUX DISEASE WITHOUT ESOPHAGITIS: ICD-10-CM

## 2025-08-12 RX ORDER — OMEPRAZOLE 10 MG/1
10 CAPSULE, DELAYED RELEASE ORAL DAILY
Qty: 90 CAPSULE | Refills: 1 | OUTPATIENT
Start: 2025-08-12

## 2025-08-29 DIAGNOSIS — E78.2 MIXED HYPERLIPIDEMIA: ICD-10-CM

## 2025-08-29 RX ORDER — LOVASTATIN 20 MG/1
20 TABLET ORAL EVERY OTHER DAY
Qty: 45 TABLET | Refills: 1 | Status: SHIPPED | OUTPATIENT
Start: 2025-08-29

## 2025-09-02 DIAGNOSIS — F41.9 ANXIETY: ICD-10-CM

## 2025-09-02 RX ORDER — ESCITALOPRAM OXALATE 5 MG/1
5 TABLET ORAL DAILY
Qty: 90 TABLET | Refills: 1 | OUTPATIENT
Start: 2025-09-02

## 2025-09-05 DIAGNOSIS — F41.9 ANXIETY: ICD-10-CM

## 2025-09-05 RX ORDER — ESCITALOPRAM OXALATE 5 MG/1
5 TABLET ORAL DAILY
Qty: 90 TABLET | Refills: 1 | OUTPATIENT
Start: 2025-09-05

## (undated) DEVICE — TUBING HYDR IRR --

## (undated) DEVICE — FORCEPS BX L240CM JAW DIA2.8MM L CAP W/ NDL MIC MESH TOOTH